# Patient Record
Sex: MALE | Race: WHITE | NOT HISPANIC OR LATINO | Employment: FULL TIME | URBAN - NONMETROPOLITAN AREA
[De-identification: names, ages, dates, MRNs, and addresses within clinical notes are randomized per-mention and may not be internally consistent; named-entity substitution may affect disease eponyms.]

---

## 2018-02-02 ENCOUNTER — TRANSCRIBE ORDERS (OUTPATIENT)
Dept: ADMINISTRATIVE | Facility: HOSPITAL | Age: 43
End: 2018-02-02

## 2018-02-02 DIAGNOSIS — N18.2 CHRONIC KIDNEY DISEASE, STAGE II (MILD): Primary | ICD-10-CM

## 2020-06-26 ENCOUNTER — TRANSCRIBE ORDERS (OUTPATIENT)
Dept: ADMINISTRATIVE | Facility: HOSPITAL | Age: 45
End: 2020-06-26

## 2020-06-26 DIAGNOSIS — R94.31 NONSPECIFIC ABNORMAL ELECTROCARDIOGRAM (ECG) (EKG): Primary | ICD-10-CM

## 2020-06-26 DIAGNOSIS — I10 ESSENTIAL HYPERTENSION, MALIGNANT: ICD-10-CM

## 2020-07-02 ENCOUNTER — HOSPITAL ENCOUNTER (OUTPATIENT)
Dept: CARDIOLOGY | Facility: HOSPITAL | Age: 45
Discharge: HOME | End: 2020-07-02
Admitting: INTERNAL MEDICINE

## 2020-07-02 DIAGNOSIS — I10 ESSENTIAL HYPERTENSION, MALIGNANT: ICD-10-CM

## 2020-07-02 DIAGNOSIS — R94.31 NONSPECIFIC ABNORMAL ELECTROCARDIOGRAM (ECG) (EKG): ICD-10-CM

## 2020-07-02 LAB
BH CV ECHO MEAS - % IVS THICK: 18.6 %
BH CV ECHO MEAS - % LVPW THICK: 35.3 %
BH CV ECHO MEAS - ACS: 2.9 CM
BH CV ECHO MEAS - AO ROOT AREA (BSA CORRECTED): 1.8
BH CV ECHO MEAS - AO ROOT AREA: 13.2 CM^2
BH CV ECHO MEAS - AO ROOT DIAM: 4.1 CM
BH CV ECHO MEAS - BSA(HAYCOCK): 2.4 M^2
BH CV ECHO MEAS - BSA: 2.3 M^2
BH CV ECHO MEAS - BZI_BMI: 31.7 KILOGRAMS/M^2
BH CV ECHO MEAS - BZI_METRIC_HEIGHT: 185.4 CM
BH CV ECHO MEAS - BZI_METRIC_WEIGHT: 108.9 KG
BH CV ECHO MEAS - EDV(CUBED): 194.1 ML
BH CV ECHO MEAS - EDV(TEICH): 165.9 ML
BH CV ECHO MEAS - EF(CUBED): 51.6 %
BH CV ECHO MEAS - EF(TEICH): 43 %
BH CV ECHO MEAS - ESV(CUBED): 93.9 ML
BH CV ECHO MEAS - ESV(TEICH): 94.6 ML
BH CV ECHO MEAS - FS: 21.5 %
BH CV ECHO MEAS - IVS/LVPW: 1.1
BH CV ECHO MEAS - IVSD: 1.7 CM
BH CV ECHO MEAS - IVSS: 2 CM
BH CV ECHO MEAS - LA DIMENSION: 5.2 CM
BH CV ECHO MEAS - LA/AO: 1.3
BH CV ECHO MEAS - LV MASS(C)D: 443.6 GRAMS
BH CV ECHO MEAS - LV MASS(C)DI: 190.8 GRAMS/M^2
BH CV ECHO MEAS - LV MASS(C)S: 450.8 GRAMS
BH CV ECHO MEAS - LV MASS(C)SI: 193.9 GRAMS/M^2
BH CV ECHO MEAS - LVIDD: 5.8 CM
BH CV ECHO MEAS - LVIDS: 4.5 CM
BH CV ECHO MEAS - LVOT AREA (M): 5.7 CM^2
BH CV ECHO MEAS - LVOT AREA: 5.7 CM^2
BH CV ECHO MEAS - LVOT DIAM: 2.7 CM
BH CV ECHO MEAS - LVPWD: 1.5 CM
BH CV ECHO MEAS - LVPWS: 2.1 CM
BH CV ECHO MEAS - MV A MAX VEL: 81 CM/SEC
BH CV ECHO MEAS - MV E MAX VEL: 74.6 CM/SEC
BH CV ECHO MEAS - MV E/A: 0.92
BH CV ECHO MEAS - PA ACC TIME: 0.09 SEC
BH CV ECHO MEAS - PA PR(ACCEL): 37.6 MMHG
BH CV ECHO MEAS - RVDD: 3.2 CM
BH CV ECHO MEAS - SI(CUBED): 43.1 ML/M^2
BH CV ECHO MEAS - SI(TEICH): 30.7 ML/M^2
BH CV ECHO MEAS - SV(CUBED): 100.2 ML
BH CV ECHO MEAS - SV(TEICH): 71.3 ML
MAXIMAL PREDICTED HEART RATE: 176 BPM
STRESS TARGET HR: 150 BPM

## 2020-07-02 PROCEDURE — 93306 TTE W/DOPPLER COMPLETE: CPT

## 2020-12-03 ENCOUNTER — TRANSCRIBE ORDERS (OUTPATIENT)
Dept: PHYSICAL THERAPY | Facility: CLINIC | Age: 45
End: 2020-12-03

## 2020-12-03 DIAGNOSIS — M77.8 LEFT ELBOW TENDONITIS: Primary | ICD-10-CM

## 2020-12-04 ENCOUNTER — TREATMENT (OUTPATIENT)
Dept: PHYSICAL THERAPY | Facility: CLINIC | Age: 45
End: 2020-12-04

## 2020-12-04 DIAGNOSIS — G56.22 CUBITAL TUNNEL SYNDROME ON LEFT: ICD-10-CM

## 2020-12-04 DIAGNOSIS — M25.522 LEFT ELBOW PAIN: Primary | ICD-10-CM

## 2020-12-04 PROCEDURE — 97162 PT EVAL MOD COMPLEX 30 MIN: CPT | Performed by: PHYSICAL THERAPIST

## 2020-12-04 PROCEDURE — 97110 THERAPEUTIC EXERCISES: CPT | Performed by: PHYSICAL THERAPIST

## 2020-12-04 PROCEDURE — 97035 APP MDLTY 1+ULTRASOUND EA 15: CPT | Performed by: PHYSICAL THERAPIST

## 2020-12-04 NOTE — PROGRESS NOTES
Physical Therapy Initial Evaluation and Plan of Care        Patient: Jose Juan Cabrera   : 1975  Diagnosis/ICD-10 Code:  Left lateral epicondylitis [M77.12]  Referring practitioner: CHARY Overton  Date of Initial Visit: 2020  Today's Date: 2020  Patient seen for 1 sessions    Visit Diagnoses:    ICD-10-CM ICD-9-CM   1. Left elbow pain  M25.522 719.42   2. Cubital tunnel syndrome on left  G56.22 354.2            Subjective Questionnaire:       Subjective Evaluation    History of Present Illness  Onset date: 2 weeks.  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  Around two weeks ago, the patient was changing an element of a compressor, and while using a long ratchet that was stuck, he struck his left medial elbow against the compressor.   Following the injury, he began to suffer from increased burning of the left  fourth and fifth finger that later resulted in numbness of theses fingers.  He notified his supervisor, and the patient was referred to [facility] the soonest available date. He was evaluated, and with continued pain a week later, the patient was ordered physical therapy for treatment of elbow pain.    Quality of life: good    Pain  Current pain ratin  At best pain ratin  At worst pain rating: 10  Location: olecranon canal  Quality: sharp  Exacerbated by: weight bearing.  Progression: no change    Hand dominance: right    Patient Goals  Patient goals for therapy: decreased pain, increased strength, independence with ADLs/IADLs, return to sport/leisure activities and return to work             Objective          Observations     Additional Elbow Observation Details  No bruising or edema noted to left arm    Palpation     Additional Palpation Details  Reports tenderness along the left medial epicondyle and along the cubital tunnel     Neurological Testing     Sensation     Elbow   Left Elbow   Intact: light  touch    Right Elbow   Intact: light touch    Comments   Left light touch: reports left ulna distribution numb in the mornings.     Active Range of Motion     Left Elbow   Flexion: WFL  Extension: WFL  Forearm supination: WFL  Forearm pronation: WFL    Right Elbow   Flexion: WFL  Extension: WFL  Forearm supination: WFL  Forearm pronation: WFL    Left Wrist   Wrist flexion: 64 degrees   Wrist extension: 68 degrees     Right Wrist   Wrist flexion: 70 degrees   Wrist extension: 75 degrees     Strength/Myotome Testing     Left Elbow   Flexion: 4  Extension: 4-  Forearm supination: 4  Forearm pronation: 4    Right Elbow   Flexion: 4+  Extension: 4+  Forearm supination: 4+  Forearm pronation: 4+    Left Wrist/Hand   Wrist extension: 4- (pain)  Wrist flexion: 4- (pain)  Radial deviation: 3+  Ulnar deviation: 4-     (2nd hand position)     Trial 1: 125 lbs    Trial 2: 122 lbs    Trial 3: 110 lbs    Average: 119 lbs    Right Wrist/Hand   Wrist extension: 4+  Wrist flexion: 4+  Radial deviation: 4+  Ulnar deviation: 4+     (2nd hand position)     Trial 1: 130 lbs    Trial 2: 135 lbs    Trial 3: 125 lbs    Average: 130 lbs    Tests     Left Elbow   Negative valgus stress at 0 degrees and varus stress at 0 degrees.           Assessment & Plan     Assessment  Impairments: abnormal muscle firing, abnormal or restricted ROM, activity intolerance, impaired physical strength, lacks appropriate home exercise program, pain with function, safety issue and weight-bearing intolerance  Assessment details: Pt is a 46 y/o male referred to therapy for rehab following a cubital tunnel injury.  Pt presents with decreased wrist ROM, tenderness along the left elbow medial epicondyle and cubital tunnel with inflammation of left ulna nerve.  Therapy will follow for restoration of rom, improved stability and decreased pain for safe return to occupational tasks.  Prognosis: good  Functional Limitations: carrying objects, lifting, pulling,  uncomfortable because of pain and unable to perform repetitive tasks  Goals  Plan Goals: STG 2 weeks    1 Pt will be instructed in a HEP.  2 Pt will report a 50% decrease in numbness from left ulna nerve distribution.  3 Pt will improved left wrist ROM to the same as right.    LTG 6 weeks    1 Pt will report pain no greater tan 2/10 with all occupational tasks.  2 Pt will be able to weight bear on left UE by demonstration of 10 wall push ups with no pain.  3 Pt will report no numbness in the left fourth and fifth digits.    Plan  Therapy options: will be seen for skilled physical therapy services  Planned modality interventions: TENS, thermotherapy (hydrocollator packs) and ultrasound  Planned therapy interventions: balance/weight-bearing training, body mechanics training, flexibility, functional ROM exercises, home exercise program, IADL retraining, joint mobilization, manual therapy, neuromuscular re-education, postural training, soft tissue mobilization, strengthening, stretching and therapeutic activities  Duration in visits: 12  Duration in weeks: 6  Treatment plan discussed with: patient  Plan details: Will follow for optimal gains    Moderate Evaluation  28022    Re-evaluation   03532      Therapeutic exercise  50514  Therapeutic activity    99591  Neuromuscular re-education   53122  Manual therapy   09427    Attended e-stim  65788  Unattended e-stim (Private)  49114  Moist heat/cryotherapy 79949   Ultrasound   50486            Visit Diagnoses:    ICD-10-CM ICD-9-CM   1. Left lateral epicondylitis  M77.12 726.32   2. Left elbow pain  M25.522 719.42       Timed:  Manual Therapy:         mins  03865;  Therapeutic Exercise:    10     mins  55172;     Neuromuscular Claudia:        mins  03031;    Therapeutic Activity:          mins  84932;     Gait Training:           mins  48523;     Ultrasound:     8     mins  28309;    Electrical Stimulation:         mins  33858 ( );    Untimed:  Electrical Stimulation:          mins  72482 ( );  Mechanical Traction:         mins  16926;     Timed Treatment:   18   mins   Total Treatment:     50   mins    PT SIGNATURE: Butch Paiz, PT   DATE TREATMENT INITIATED: 12/4/2020    Initial Certification  Certification Period: 3/4/2021  I certify that the therapy services are furnished while this patient is under my care.  The services outlined above are required by this patient, and will be reviewed every 90 days.     PHYSICIAN: Alexandra Manley, APRN      DATE:     Please sign and return via fax to [phone].. Thank you, [facility].

## 2020-12-11 ENCOUNTER — TREATMENT (OUTPATIENT)
Dept: PHYSICAL THERAPY | Facility: CLINIC | Age: 45
End: 2020-12-11

## 2020-12-11 DIAGNOSIS — G56.22 CUBITAL TUNNEL SYNDROME ON LEFT: ICD-10-CM

## 2020-12-11 DIAGNOSIS — M25.522 LEFT ELBOW PAIN: Primary | ICD-10-CM

## 2020-12-11 PROCEDURE — 97530 THERAPEUTIC ACTIVITIES: CPT | Performed by: PHYSICAL THERAPIST

## 2020-12-11 PROCEDURE — 97035 APP MDLTY 1+ULTRASOUND EA 15: CPT | Performed by: PHYSICAL THERAPIST

## 2020-12-11 PROCEDURE — 97110 THERAPEUTIC EXERCISES: CPT | Performed by: PHYSICAL THERAPIST

## 2020-12-11 NOTE — PROGRESS NOTES
Physical Therapy Daily Progress Note      Patient: Jose Juan Cabrera   : 1975  Referring practitioner: CHARY Overton  Date of Initial Visit: Type: THERAPY  Noted: 2020  Today's Date: 2020  Patient seen for 2 sessions           Subjective Evaluation    History of Present Illness    Subjective comment: Pt reports 6/10 left elbow pain prior to today's session. He states he continues to have numbness of the fourth and fifth fingers on the left hand.Pain  Current pain ratin           Objective   See Exercise, Manual, and Modality Logs for complete treatment.       Assessment & Plan     Assessment  Assessment details: Therapeutic exercises were performed to address left forearm and wrist strength, as well as muscle stretching. Therapeutic activities included dowel nayely twisting and radial/ulnar deviation with 2# weights to simulate wrist movements required for his job. Rest breaks were provided as needed secondary to muscle fatigue. Fasciculations were observed with eccentric control on weighted activities. The session concluded with therapeutic ultrasound to the left elbow musculature, with no skin irritation observed.    Plan  Plan details: Progress with strengthening and endurance activities for improved function.        Visit Diagnoses:    ICD-10-CM ICD-9-CM   1. Left elbow pain  M25.522 719.42   2. Cubital tunnel syndrome on left  G56.22 354.2       Progress per Plan of Care           Timed:  Manual Therapy:         mins  69074;  Therapeutic Exercise:    26     mins  52688;     Neuromuscular Claudia:        mins  58972;    Therapeutic Activity:    13      mins  63727;     Gait Training:           mins  62199;     Ultrasound:     8     mins  94484;    Electrical Stimulation:         mins  94251 ( );    Untimed:  Electrical Stimulation:         mins  59476 ( );  Mechanical Traction:         mins  56848;     Timed Treatment:   47   mins   Total Treatment:    47     mins Ashley Claudene Dalton, PT  Physical Therapist

## 2020-12-18 ENCOUNTER — TRANSCRIBE ORDERS (OUTPATIENT)
Dept: ADMINISTRATIVE | Facility: HOSPITAL | Age: 45
End: 2020-12-18

## 2020-12-18 ENCOUNTER — HOSPITAL ENCOUNTER (OUTPATIENT)
Dept: GENERAL RADIOLOGY | Facility: HOSPITAL | Age: 45
Discharge: HOME | End: 2020-12-18

## 2020-12-18 ENCOUNTER — TREATMENT (OUTPATIENT)
Dept: PHYSICAL THERAPY | Facility: CLINIC | Age: 45
End: 2020-12-18

## 2020-12-18 DIAGNOSIS — G56.22 CUBITAL TUNNEL SYNDROME ON LEFT: ICD-10-CM

## 2020-12-18 DIAGNOSIS — S53.115A CLOSED TRAUMATIC ANTERIOR DISLOCATION OF LEFT ELBOW JOINT, INITIAL ENCOUNTER: ICD-10-CM

## 2020-12-18 DIAGNOSIS — M25.522 LEFT ELBOW PAIN: Primary | ICD-10-CM

## 2020-12-18 DIAGNOSIS — S53.115A CLOSED TRAUMATIC ANTERIOR DISLOCATION OF LEFT ELBOW JOINT, INITIAL ENCOUNTER: Primary | ICD-10-CM

## 2020-12-18 PROCEDURE — 97035 APP MDLTY 1+ULTRASOUND EA 15: CPT | Performed by: PHYSICAL THERAPIST

## 2020-12-18 PROCEDURE — 73080 X-RAY EXAM OF ELBOW: CPT

## 2020-12-18 PROCEDURE — 73080 X-RAY EXAM OF ELBOW: CPT | Performed by: RADIOLOGY

## 2020-12-18 PROCEDURE — 97110 THERAPEUTIC EXERCISES: CPT | Performed by: PHYSICAL THERAPIST

## 2020-12-18 PROCEDURE — 97530 THERAPEUTIC ACTIVITIES: CPT | Performed by: PHYSICAL THERAPIST

## 2020-12-18 NOTE — PROGRESS NOTES
"   Physical Therapy Daily Progress Note      Patient: Jose Juan Cabrera   : 1975  Referring practitioner: CHARY Overton  Date of Initial Visit: Type: THERAPY  Noted: 2020  Today's Date: 2020  Patient seen for 3 sessions             Subjective Evaluation    History of Present Illness    Subjective comment: The patient reported numbness/tingling of the fourth and fifth fingers on the left hand prior to today's session. He stated he \"constantly has numbness\".Pain  Current pain ratin           Objective   See Exercise, Manual, and Modality Logs for complete treatment.       Assessment & Plan     Assessment  Assessment details: Patient was seen by CHARY Dutton prior to today's treatment session. She stated she is going to order and x-ray and MRI to further assess the patient's left elbow. Therapeutic activities were performed to improve the patient's ability to perform wrist deviation, as well as pronation and supination, required to perform job tasks. Nerve glides were also performed to address numbness and tingling into the ulnar distribution. No skin irritation was observed following therapeutic ultrasound to the left elbow.    Plan  Plan details: Progress per POC and CHARY Dutton recommendation based on imaging results.        Visit Diagnoses:    ICD-10-CM ICD-9-CM   1. Left elbow pain  M25.522 719.42   2. Cubital tunnel syndrome on left  G56.22 354.2       Progress per Plan of Care           Timed:  Manual Therapy:         mins  58963;  Therapeutic Exercise:    24     mins  69294;     Neuromuscular Claudia:        mins  30607;    Therapeutic Activity:     12     mins  69556;     Gait Training:           mins  71317;     Ultrasound:     8     mins  27270;    Electrical Stimulation:         mins  90325 ( );    Untimed:  Electrical Stimulation:         mins  52713 ( );  Mechanical Traction:         mins  95738;     Timed Treatment:   44   mins   Total " Treatment:     44   mins    Ashley Claudene Dalton, PT  Physical Therapist

## 2020-12-28 ENCOUNTER — TRANSCRIBE ORDERS (OUTPATIENT)
Dept: ADMINISTRATIVE | Facility: HOSPITAL | Age: 45
End: 2020-12-28

## 2020-12-28 DIAGNOSIS — M25.522 LEFT ELBOW PAIN: Primary | ICD-10-CM

## 2021-01-07 ENCOUNTER — TREATMENT (OUTPATIENT)
Dept: PHYSICAL THERAPY | Facility: CLINIC | Age: 46
End: 2021-01-07

## 2021-01-07 DIAGNOSIS — G56.22 CUBITAL TUNNEL SYNDROME ON LEFT: ICD-10-CM

## 2021-01-07 DIAGNOSIS — M25.522 LEFT ELBOW PAIN: Primary | ICD-10-CM

## 2021-01-07 PROCEDURE — 97110 THERAPEUTIC EXERCISES: CPT | Performed by: PHYSICAL THERAPIST

## 2021-01-07 PROCEDURE — 97035 APP MDLTY 1+ULTRASOUND EA 15: CPT | Performed by: PHYSICAL THERAPIST

## 2021-01-07 PROCEDURE — 97530 THERAPEUTIC ACTIVITIES: CPT | Performed by: PHYSICAL THERAPIST

## 2021-01-07 NOTE — PROGRESS NOTES
Patient: Jose Juan Cabrera   : 1975  Referring practitioner: CHARY Overton  Date of Initial Visit: Type: THERAPY  Noted: 2020  Today's Date: 2021  Patient seen for 4 sessions           Subjective Evaluation    History of Present Illness    Subjective comment: Pt reprots having continued increased pain at 7/10.  He reports he has been scheduled an MRI of the elbow.       Objective   See Exercise, Manual, and Modality Logs for complete treatment.       Assessment & Plan     Assessment  Assessment details: Tx today consisted of there ex for wrist and forearm stretching followed by stability and functional training and ended with US to elbow for decreased inflammation.  Pt responded well to tx with similar pain at /10 post tx.    Plan  Plan details: Therapy will follow progressing elbow stability and decreased pain for improved performance of work.        Visit Diagnoses:    ICD-10-CM ICD-9-CM   1. Left elbow pain  M25.522 719.42   2. Cubital tunnel syndrome on left  G56.22 354.2       Progress strengthening /stabilization /functional activity           Timed:  Manual Therapy:         mins  77422;  Therapeutic Exercise:    25     mins  85261;     Neuromuscular Claudia:        mins  60094;    Therapeutic Activity:     12     mins  64016;     Gait Training:           mins  64300;     Ultrasound:     8     mins  29159;    Electrical Stimulation:         mins  26926 ( );    Untimed:  Electrical Stimulation:         mins  49499 ( );  Mechanical Traction:         mins  79489;     Timed Treatment:   45   mins   Total Treatment:     45   mins  Butch Paiz PT  Physical Therapist

## 2021-01-11 ENCOUNTER — HOSPITAL ENCOUNTER (OUTPATIENT)
Dept: MRI IMAGING | Facility: HOSPITAL | Age: 46
Discharge: HOME | End: 2021-01-11
Admitting: NURSE PRACTITIONER

## 2021-01-11 DIAGNOSIS — M25.522 LEFT ELBOW PAIN: ICD-10-CM

## 2021-01-11 PROCEDURE — 73221 MRI JOINT UPR EXTREM W/O DYE: CPT

## 2021-01-11 PROCEDURE — 73221 MRI JOINT UPR EXTREM W/O DYE: CPT | Performed by: RADIOLOGY

## 2021-01-15 ENCOUNTER — TREATMENT (OUTPATIENT)
Dept: PHYSICAL THERAPY | Facility: CLINIC | Age: 46
End: 2021-01-15

## 2021-01-15 DIAGNOSIS — G56.22 CUBITAL TUNNEL SYNDROME ON LEFT: ICD-10-CM

## 2021-01-15 DIAGNOSIS — M25.522 LEFT ELBOW PAIN: Primary | ICD-10-CM

## 2021-01-15 PROCEDURE — 97110 THERAPEUTIC EXERCISES: CPT | Performed by: PHYSICAL THERAPIST

## 2021-01-15 PROCEDURE — 97530 THERAPEUTIC ACTIVITIES: CPT | Performed by: PHYSICAL THERAPIST

## 2021-01-15 PROCEDURE — 97035 APP MDLTY 1+ULTRASOUND EA 15: CPT | Performed by: PHYSICAL THERAPIST

## 2021-01-15 NOTE — PROGRESS NOTES
"Progress Note      Patient: Jose Juan Cabrera   : 1975  Diagnosis/ICD-10 Code:  Left elbow pain [M25.522]  Referring practitioner: CHARY Overton  Date of Initial Visit: Type: THERAPY  Noted: 2020  Today's Date: 1/15/2021  Patient seen for 5 sessions      Subjective:   Subjective Questionnaire: QuickDASH: 18.18% impaired  Clinical Progress: improved  Home Program Compliance: Yes    Subjective Evaluation    History of Present Illness    Subjective comment: Patient reports that his \"elbow is the same as it was the first day.\"  He notes that numbness is present only in the morning.  He notes that he is waiting to receive an appt with orthopedic MD, but will see referring MD on 2021 if he has not yet seen the orthopedic.Pain  Current pain ratin  At best pain ratin  At worst pain ratin         Objective          Active Range of Motion     Left Elbow   Flexion: WFL  Extension: WFL  Forearm supination: WFL  Forearm pronation: WFL    Right Elbow   Flexion: WFL  Extension: WFL  Forearm supination: WFL  Forearm pronation: WFL    Left Wrist   Wrist flexion: 64 degrees   Wrist extension: 70 degrees     Right Wrist   Wrist flexion: 70 degrees   Wrist extension: 75 degrees     Strength/Myotome Testing     Left Elbow   Flexion: 4+  Extension: 4+  Forearm supination: 4+  Forearm pronation: 4+    Right Elbow   Flexion: 4+  Extension: 4+  Forearm supination: 4+  Forearm pronation: 4+    Left Wrist/Hand   Wrist extension: 4 (pain)  Wrist flexion: 4 (pain)  Radial deviation: 4  Ulnar deviation: 4     (2nd hand position)    Trial 1: 140 lbs    Trial 2: 120 lbs    Trial 3: 130 lbs    Average: 130 lbs    Right Wrist/Hand   Wrist extension: 4+  Wrist flexion: 4+  Radial deviation: 4+  Ulnar deviation: 4+     (2nd hand position)     Trial 1: 150 lbs    Trial 2: 140 lbs    Trial 3: 135 lbs    Average: 141.67 lbs    Additional Strength Details  Pain noted with pronation/supination MMT    "   Assessment & Plan     Assessment  Impairments: abnormal muscle firing, abnormal or restricted ROM, activity intolerance, impaired physical strength, lacks appropriate home exercise program, pain with function, safety issue and weight-bearing intolerance  Assessment details: Patient has been attending therapy for rehabilitation following a cubital tunnel injury. Patient reports 7/10 pain at best and 8/10 pain at worst.  Patient has shown slight improvement in wrist extension ROM, but no measurable changes are noted in wrist flexion ROM.  Strength improvements are noted at left wrist and elbow during MMT.  Patient reports an 18.18% functional mobility impairment, based on his response to the QuickDash.  He will continue to be progressed per his tolerance and POC.   Prognosis: good  Functional Limitations: carrying objects, lifting, pulling, uncomfortable because of pain and unable to perform repetitive tasks  Goals  Plan Goals: STG 2 weeks  1 Pt will be instructed in a HEP.  Met  2 Pt will report a 50% decrease in numbness from left ulna nerve distribution.  Ongoing-0% improvement reported, per patient  3 Pt will improved left wrist ROM to the same as right.   Ongoing, progressing    LTG 6 weeks  1 Pt will report pain no greater than 2/10 with all occupational tasks.  Ongoing, progressing-up to 10/10 pain with work tasks  2 Pt will be able to weight bear on left UE by demonstration of 10 wall push ups with no pain.  Ongoing, progressing  3 Pt will report no numbness in the left fourth and fifth digits.  Ongoing-reports no change in numbness in the 4th and 5th digits    Plan  Therapy options: will be seen for skilled physical therapy services  Planned modality interventions: TENS, thermotherapy (hydrocollator packs) and ultrasound  Planned therapy interventions: balance/weight-bearing training, body mechanics training, flexibility, functional ROM exercises, home exercise program, IADL retraining, joint mobilization,  manual therapy, neuromuscular re-education, postural training, soft tissue mobilization, strengthening, stretching and therapeutic activities  Duration in visits: 8  Duration in weeks: 4  Treatment plan discussed with: patient  Plan details: Re-evaluation   13721      Therapeutic exercise  68465  Therapeutic activity    63322  Neuromuscular re-education   02829  Manual therapy   71481    Attended e-stim  37274  Unattended e-stim (Private)  68291  Moist heat/cryotherapy 83322   Ultrasound   79134            Visit Diagnoses:    ICD-10-CM ICD-9-CM   1. Left elbow pain  M25.522 719.42   2. Cubital tunnel syndrome on left  G56.22 354.2       Progress toward previous goals: Ongoing, progressing      Recommendations: Continue as planned  Timeframe: 1 month  Prognosis to achieve goals: good    PT Signature: Magi Matute, PT      Based upon review of the patient's progress and continued therapy plan, it is my medical opinion that Jose Juan Cabrera should continue physical therapy treatment at [facility]  [address]  [address]  [facility] [facility] [place] [address] [phone].    Signature: __________________________________  Alexandra Manley, CHARY    Timed:  Manual Therapy:         mins  99090;  Therapeutic Exercise:    25     mins  37958;     Neuromuscular Claudia:        mins  34010;    Therapeutic Activity:     11     mins  58608;     Gait Training:           mins  31514;     Ultrasound:     8     mins  23400;    Electrical Stimulation:         mins  87588 ( );    Untimed:  Electrical Stimulation:         mins  71696 ( );  Mechanical Traction:         mins  22666;     Timed Treatment:   44   mins   Total Treatment:     44   mins

## 2021-01-22 ENCOUNTER — TREATMENT (OUTPATIENT)
Dept: PHYSICAL THERAPY | Facility: CLINIC | Age: 46
End: 2021-01-22

## 2021-01-22 DIAGNOSIS — G56.22 CUBITAL TUNNEL SYNDROME ON LEFT: ICD-10-CM

## 2021-01-22 DIAGNOSIS — M25.522 LEFT ELBOW PAIN: Primary | ICD-10-CM

## 2021-01-22 PROCEDURE — 97035 APP MDLTY 1+ULTRASOUND EA 15: CPT | Performed by: PHYSICAL THERAPIST

## 2021-01-22 PROCEDURE — 97110 THERAPEUTIC EXERCISES: CPT | Performed by: PHYSICAL THERAPIST

## 2021-01-22 PROCEDURE — 97530 THERAPEUTIC ACTIVITIES: CPT | Performed by: PHYSICAL THERAPIST

## 2021-01-22 NOTE — PROGRESS NOTES
"   Physical Therapy Daily Progress Note      Patient: Jose Juan Cabrera   : 1975  Referring practitioner: CHARY Overton  Date of Initial Visit: Type: THERAPY  Noted: 2020  Today's Date: 2021  Patient seen for 6 sessions           Subjective Evaluation    History of Present Illness    Subjective comment: Pt reports 7/10 left elbow pain prior to today's session. He states the pain is \"frustrating\". The patient reports he \"had to use it a little more this week, spiking to 10/10\". He states he is scheduled to see an orthopedic on 2021.Pain  Current pain ratin           Objective   See Exercise, Manual, and Modality Logs for complete treatment.       Assessment & Plan     Assessment  Assessment details: Today's session included therapeutic exercises to improve muscle tightness and ROM of the left elbow and wrist. Therapeutic activities were performed to improve functional movements and activities required for him job. The patient reported weakness with wrist flexion and extension activities with a 2# weight. Therapeutic ultrasound was performed on the left wrist flexion, with no skin irritation observed. The patient reported no increase in pain following today's session.     Plan  Plan details: Continue to progress strengthening activities for improved strength.        Visit Diagnoses:    ICD-10-CM ICD-9-CM   1. Left elbow pain  M25.522 719.42   2. Cubital tunnel syndrome on left  G56.22 354.2       Progress per Plan of Care           Timed:  Manual Therapy:         mins  05625;  Therapeutic Exercise:    26     mins  72531;     Neuromuscular Claudia:        mins  78325;    Therapeutic Activity:    10      mins  26010;     Gait Training:           mins  64188;     Ultrasound:     8     mins  57503;    Electrical Stimulation:         mins  69094 ( );    Untimed:  Electrical Stimulation:         mins  49886 ( );  Mechanical Traction:         mins  41573;     Timed Treatment:   " 44   mins   Total Treatment:     44   mins    Ashley Claudene Dalton, PT  Physical Therapist

## 2021-01-29 ENCOUNTER — TREATMENT (OUTPATIENT)
Dept: PHYSICAL THERAPY | Facility: CLINIC | Age: 46
End: 2021-01-29

## 2021-01-29 DIAGNOSIS — M25.522 LEFT ELBOW PAIN: Primary | ICD-10-CM

## 2021-01-29 DIAGNOSIS — G56.22 CUBITAL TUNNEL SYNDROME ON LEFT: ICD-10-CM

## 2021-01-29 PROCEDURE — 97530 THERAPEUTIC ACTIVITIES: CPT | Performed by: PHYSICAL THERAPIST

## 2021-01-29 PROCEDURE — 97035 APP MDLTY 1+ULTRASOUND EA 15: CPT | Performed by: PHYSICAL THERAPIST

## 2021-01-29 PROCEDURE — 97110 THERAPEUTIC EXERCISES: CPT | Performed by: PHYSICAL THERAPIST

## 2021-01-29 NOTE — PROGRESS NOTES
Physical Therapy Daily Treatment Note      Patient: Jose Juan Cabrera   : 1975  Referring practitioner: CHARY Overton  Date of Initial Visit: Type: THERAPY  Noted: 2020  Today's Date: 2021  Patient seen for 7 sessions             Subjective Evaluation    History of Present Illness    Subjective comment: Pt reports 7/10 left elbow pain prior to today's session. He states he is scheduled to see an orthopedic on 21.Pain  Current pain ratin           Objective   See Exercise, Manual, and Modality Logs for complete treatment.       Assessment & Plan     Assessment  Assessment details: Therapeutic activities were performed during today's session to improve ability to perform functional tasks such as using a hammer and screwing in objects which would require pronation and supination. Therapeutic exercises were performed to address strength and ROM. Rest breaks were provided as needed and no increase in pain was reported with therapeutic exercises and activities. No skin irritation was observed following therapeutic ultrasound to the medial aspect of the left elbow at the conclusion of today's session.    Plan  Plan details: Progress as instructed by orthopedic.        Visit Diagnoses:    ICD-10-CM ICD-9-CM   1. Left elbow pain  M25.522 719.42   2. Cubital tunnel syndrome on left  G56.22 354.2       Progress per Plan of Care           Timed:  Manual Therapy:         mins  41681;  Therapeutic Exercise:    25     mins  83471;     Neuromuscular Claudia:        mins  18310;    Therapeutic Activity:     12     mins  33003;     Gait Training:           mins  24384;     Ultrasound:     8     mins  88596;    Electrical Stimulation:         mins  76029 ( );    Untimed:  Electrical Stimulation:         mins  82316 ( );  Mechanical Traction:         mins  30808;     Timed Treatment:   45   mins   Total Treatment:      45  mins    Ashley Claudene Dalton, PT  Physical  Therapist

## 2023-08-18 DIAGNOSIS — Z12.11 ENCOUNTER FOR SCREENING FOR MALIGNANT NEOPLASM OF COLON: Primary | ICD-10-CM

## 2023-08-18 RX ORDER — BISACODYL 5 MG/1
20 TABLET, DELAYED RELEASE ORAL ONCE
Qty: 4 TABLET | Refills: 0 | Status: SHIPPED | OUTPATIENT
Start: 2023-08-18 | End: 2023-08-18

## 2023-08-18 RX ORDER — POLYETHYLENE GLYCOL 3350 17 G/17G
510 POWDER, FOR SOLUTION ORAL ONCE
Qty: 510 G | Refills: 0 | Status: SHIPPED | OUTPATIENT
Start: 2023-08-18 | End: 2023-08-18

## 2023-10-12 ENCOUNTER — ANESTHESIA (OUTPATIENT)
Dept: PERIOP | Facility: HOSPITAL | Age: 48
End: 2023-10-12
Payer: MEDICAID

## 2023-10-12 ENCOUNTER — ANESTHESIA EVENT (OUTPATIENT)
Dept: PERIOP | Facility: HOSPITAL | Age: 48
End: 2023-10-12
Payer: MEDICAID

## 2023-10-12 ENCOUNTER — HOSPITAL ENCOUNTER (OUTPATIENT)
Facility: HOSPITAL | Age: 48
Setting detail: HOSPITAL OUTPATIENT SURGERY
Discharge: HOME | End: 2023-10-12
Attending: INTERNAL MEDICINE | Admitting: INTERNAL MEDICINE
Payer: MEDICAID

## 2023-10-12 VITALS
BODY MASS INDEX: 33.13 KG/M2 | WEIGHT: 250 LBS | SYSTOLIC BLOOD PRESSURE: 141 MMHG | HEIGHT: 73 IN | HEART RATE: 72 BPM | RESPIRATION RATE: 18 BRPM | TEMPERATURE: 97.1 F | OXYGEN SATURATION: 93 % | DIASTOLIC BLOOD PRESSURE: 103 MMHG

## 2023-10-12 DIAGNOSIS — Z12.11 ENCOUNTER FOR SCREENING FOR MALIGNANT NEOPLASM OF COLON: ICD-10-CM

## 2023-10-12 PROCEDURE — 45385 COLONOSCOPY W/LESION REMOVAL: CPT | Performed by: INTERNAL MEDICINE

## 2023-10-12 PROCEDURE — 25010000002 PROPOFOL 200 MG/20ML EMULSION: Performed by: NURSE ANESTHETIST, CERTIFIED REGISTERED

## 2023-10-12 PROCEDURE — 25810000003 LACTATED RINGERS PER 1000 ML: Performed by: ANESTHESIOLOGY

## 2023-10-12 RX ORDER — SODIUM CHLORIDE, SODIUM LACTATE, POTASSIUM CHLORIDE, CALCIUM CHLORIDE 600; 310; 30; 20 MG/100ML; MG/100ML; MG/100ML; MG/100ML
100 INJECTION, SOLUTION INTRAVENOUS ONCE AS NEEDED
Status: DISCONTINUED | OUTPATIENT
Start: 2023-10-12 | End: 2023-10-12 | Stop reason: HOSPADM

## 2023-10-12 RX ORDER — PROPOFOL 10 MG/ML
INJECTION, EMULSION INTRAVENOUS CONTINUOUS PRN
Status: DISCONTINUED | OUTPATIENT
Start: 2023-10-12 | End: 2023-10-12 | Stop reason: SURG

## 2023-10-12 RX ORDER — SODIUM CHLORIDE, SODIUM LACTATE, POTASSIUM CHLORIDE, CALCIUM CHLORIDE 600; 310; 30; 20 MG/100ML; MG/100ML; MG/100ML; MG/100ML
125 INJECTION, SOLUTION INTRAVENOUS ONCE
Status: COMPLETED | OUTPATIENT
Start: 2023-10-12 | End: 2023-10-12

## 2023-10-12 RX ORDER — IPRATROPIUM BROMIDE AND ALBUTEROL SULFATE 2.5; .5 MG/3ML; MG/3ML
3 SOLUTION RESPIRATORY (INHALATION) ONCE AS NEEDED
Status: DISCONTINUED | OUTPATIENT
Start: 2023-10-12 | End: 2023-10-12 | Stop reason: HOSPADM

## 2023-10-12 RX ORDER — LIDOCAINE HYDROCHLORIDE 20 MG/ML
INJECTION, SOLUTION EPIDURAL; INFILTRATION; INTRACAUDAL; PERINEURAL AS NEEDED
Status: DISCONTINUED | OUTPATIENT
Start: 2023-10-12 | End: 2023-10-12 | Stop reason: SURG

## 2023-10-12 RX ORDER — ONDANSETRON 2 MG/ML
4 INJECTION INTRAMUSCULAR; INTRAVENOUS AS NEEDED
Status: DISCONTINUED | OUTPATIENT
Start: 2023-10-12 | End: 2023-10-12 | Stop reason: HOSPADM

## 2023-10-12 RX ORDER — MIDAZOLAM HYDROCHLORIDE 1 MG/ML
1 INJECTION INTRAMUSCULAR; INTRAVENOUS
Status: DISCONTINUED | OUTPATIENT
Start: 2023-10-12 | End: 2023-10-12 | Stop reason: HOSPADM

## 2023-10-12 RX ORDER — OXYCODONE HYDROCHLORIDE AND ACETAMINOPHEN 5; 325 MG/1; MG/1
1 TABLET ORAL ONCE AS NEEDED
Status: DISCONTINUED | OUTPATIENT
Start: 2023-10-12 | End: 2023-10-12 | Stop reason: HOSPADM

## 2023-10-12 RX ORDER — SODIUM CHLORIDE 0.9 % (FLUSH) 0.9 %
10 SYRINGE (ML) INJECTION AS NEEDED
Status: DISCONTINUED | OUTPATIENT
Start: 2023-10-12 | End: 2023-10-12 | Stop reason: HOSPADM

## 2023-10-12 RX ORDER — FENTANYL CITRATE 50 UG/ML
50 INJECTION, SOLUTION INTRAMUSCULAR; INTRAVENOUS
Status: DISCONTINUED | OUTPATIENT
Start: 2023-10-12 | End: 2023-10-12 | Stop reason: HOSPADM

## 2023-10-12 RX ORDER — SODIUM CHLORIDE 9 MG/ML
40 INJECTION, SOLUTION INTRAVENOUS AS NEEDED
Status: DISCONTINUED | OUTPATIENT
Start: 2023-10-12 | End: 2023-10-12 | Stop reason: HOSPADM

## 2023-10-12 RX ORDER — SODIUM CHLORIDE 0.9 % (FLUSH) 0.9 %
10 SYRINGE (ML) INJECTION EVERY 12 HOURS SCHEDULED
Status: DISCONTINUED | OUTPATIENT
Start: 2023-10-12 | End: 2023-10-12 | Stop reason: HOSPADM

## 2023-10-12 RX ORDER — MEPERIDINE HYDROCHLORIDE 25 MG/ML
12.5 INJECTION INTRAMUSCULAR; INTRAVENOUS; SUBCUTANEOUS
Status: DISCONTINUED | OUTPATIENT
Start: 2023-10-12 | End: 2023-10-12 | Stop reason: HOSPADM

## 2023-10-12 RX ADMIN — LIDOCAINE HYDROCHLORIDE 60 MG: 20 INJECTION, SOLUTION EPIDURAL; INFILTRATION; INTRACAUDAL; PERINEURAL at 08:22

## 2023-10-12 RX ADMIN — PROPOFOL 200 MCG/KG/MIN: 10 INJECTION, EMULSION INTRAVENOUS at 08:22

## 2023-10-12 RX ADMIN — SODIUM CHLORIDE, POTASSIUM CHLORIDE, SODIUM LACTATE AND CALCIUM CHLORIDE: 600; 310; 30; 20 INJECTION, SOLUTION INTRAVENOUS at 08:22

## 2023-10-12 NOTE — ANESTHESIA POSTPROCEDURE EVALUATION
Patient: Jose Juan Cabrera    Procedure Summary       Date: 10/12/23 Room / Location: [facility] OR [address] [place] [address] COR OR    Anesthesia Start: 0821 Anesthesia Stop: 0844    Procedure: COLONOSCOPY FOR SCREENING Diagnosis:       Encounter for screening for malignant neoplasm of colon      (Encounter for screening for malignant neoplasm of colon [Z12.11])    Surgeons: Tanya Vicente MD Provider: Mehdi Saldana MD    Anesthesia Type: general ASA Status: 2            Anesthesia Type: general    Vitals  Vitals Value Taken Time   /103 10/12/23 0925   Temp 97.1 øF (36.2 øC) 10/12/23 0845   Pulse 62 10/12/23 0927   Resp 18 10/12/23 0915   SpO2 92 % 10/12/23 0927   Vitals shown include unfiled device data.        Post Anesthesia Care and Evaluation    Patient location during evaluation: bedside  Patient participation: complete - patient participated  Level of consciousness: awake and alert  Pain score: 1  Pain management: adequate    Airway patency: patent  Anesthetic complications: No anesthetic complications  PONV Status: none  Cardiovascular status: acceptable  Respiratory status: acceptable  Hydration status: acceptable

## 2023-10-12 NOTE — ANESTHESIA PREPROCEDURE EVALUATION
Anesthesia Evaluation     Patient summary reviewed and Nursing notes reviewed   no history of anesthetic complications:   NPO Solid Status: > 8 hours  NPO Liquid Status: > 8 hours           Airway   Mallampati: II  TM distance: >3 FB  Neck ROM: full  Dental - normal exam     Pulmonary - negative pulmonary ROS    breath sounds clear to auscultation  Cardiovascular   Exercise tolerance: good (4-7 METS)    Rhythm: regular  Rate: normal    (+) hypertension      Neuro/Psych- negative ROS  GI/Hepatic/Renal/Endo - negative ROS     Musculoskeletal (-) negative ROS    Abdominal   (+) obese    Abdomen: soft.   Substance History - negative use     OB/GYN          Other - negative ROS       ROS/Med Hx Other:   Interpretation Summary    ú Left ventricular wall thickness is consistent with mild-to-moderate concentric hypertrophy.  ú Left ventricular systolic function is normal.  ú Estimated EF appears to be in the range of 56 - 60%.  ú Left ventricular diastolic dysfunction (grade I) consistent with impaired relaxation.  ú Normal cardiac chambers dimensions.  ú Mild aortic valve regurgitation is present.  ú Mild mitral valve regurgitation is present  ú Thetricuspid and pulmonic valves appear normal in structureand showed no regurgitations.  ú There is no evidence of pericardial effusion.                     Anesthesia Plan    ASA 2     general with block     intravenous induction     Pre-procedure education provided  Use of blood products discussed with  Consented to blood products.    Plan discussed with CRNA.    CODE STATUS:

## 2023-10-12 NOTE — H&P
[facility]    Patient Identification:  Name:  Jose Juan Cabrera  Age:  48 y.o.  Sex:  male  :  1975  MRN:  [id number]   Visit Number:  [id number]  Primary Care Physician:  Татьяна Nixon APRN       Chief complaint: Screening colonoscopy    History of presenting illness:  48 y.o. male presents today for screening colonoscopy.  The patient denies family history of colon cancer.  This is his first colonoscopy.  He has not had any unusual weight loss.  He denies abdominal pain.  Patient states that he has not had bright red blood per rectum or melena.  He denies change in bowel pattern.  Overall, the patient feels well.    ---------------------------------------------------------------------------------------------------------------------   Review of Systems   Constitutional:  Negative for activity change, appetite change, chills, fatigue, fever and unexpected weight change.   HENT:  Negative for mouth sores and trouble swallowing.    Eyes:  Negative for photophobia, pain and redness.   Respiratory:  Negative for cough and choking.    Cardiovascular:  Negative for chest pain and leg swelling.   Gastrointestinal:  Negative for abdominal distention, abdominal pain, anal bleeding, constipation, diarrhea, nausea, rectal pain and vomiting.   Endocrine: Negative for cold intolerance, heat intolerance and polyphagia.   Genitourinary:  Negative for difficulty urinating and dysuria.   Musculoskeletal:  Negative for arthralgias, joint swelling and myalgias.   Skin:  Negative for rash and wound.   Allergic/Immunologic: Negative for environmental allergies and food allergies.   Neurological:  Negative for dizziness and light-headedness.   Hematological:  Negative for adenopathy. Does not bruise/bleed easily.   Psychiatric/Behavioral:  Negative for sleep disturbance. The patient is not nervous/anxious.        ---------------------------------------------------------------------------------------------------------------------   Past Medical History:   Diagnosis Date    Ankle fracture     Fracture of wrist     Hypertension     Leg fracture      Past Surgical History:   Procedure Laterality Date    MANDIBLE SURGERY       Family History   Problem Relation Age of Onset    Rheumatologic disease Father     Hypertension Father     Cancer Maternal Grandmother     Collagen disease Maternal Grandmother      Social History     Socioeconomic History    Marital status:    Tobacco Use    Smoking status: Never   Vaping Use    Vaping Use: Never used   Substance and Sexual Activity    Alcohol use: No    Drug use: No    Sexual activity: Defer     ---------------------------------------------------------------------------------------------------------------------   Allergies:  Patient has no known allergies.  ---------------------------------------------------------------------------------------------------------------------   Prior to Admission Medications       Prescriptions Last Dose Informant Patient Reported? Taking?    ibuprofen (ADVIL,MOTRIN) 800 MG tablet Past Month  Yes Yes    Take 1 tablet by mouth Every 6 (Six) Hours As Needed for Mild Pain.    lisinopril (PRINIVIL,ZESTRIL) 20 MG tablet 10/12/2023  Yes Yes    Take 25 mg by mouth daily.          [facility] Scheduled Meds:  lactated ringers, 125 mL/hr, Intravenous, Once  sodium chloride, 10 mL, Intravenous, Q12H         ---------------------------------------------------------------------------------------------------------------------   Vital Signs:  Temp:  [97.6 °F (36.4 °C)] 97.6 °F (36.4 °C)  Heart Rate:  [63] 63  Resp:  [20] 20  BP: (162)/(116) 162/116      10/12/23  0725   Weight: 113 kg (250 lb)     Body mass index is 32.98 kg/m².  ---------------------------------------------------------------------------------------------------------------------   Physical  "Exam:  Constitutional:  Well-developed and well-nourished.  No respiratory distress.  Obese     HENT:  Head: Normocephalic and atraumatic.  Mouth:  Moist mucous membranes.    Eyes:  Conjunctivae and EOM are normal.  Pupils are equal, round, and reactive to light.  No scleral icterus.  Neck:  Neck supple.  No JVD present.    Cardiovascular:  Normal rate, regular rhythm and normal heart sounds with no murmur.  Pulmonary/Chest:  No respiratory distress, no wheezes, no crackles, with normal breath sounds and good air movement.  Abdominal:  Soft.  Bowel sounds are normal.  No distension and no tenderness.   Musculoskeletal:  No edema, no tenderness, and no deformity.  No red or swollen joints anywhere.    Neurological:  Alert and oriented to person, place, and time.  No cranial nerve deficit.  No tongue deviation.  No facial droop.  No slurred speech.   Skin:  Skin is warm and dry.  No rash noted.  No pallor.   Psychiatric:  Normal mood and affect.  Behavior is normal.  Judgment and thought content normal.   Peripheral vascular:  No edema and strong pulses on all 4 extremities.  Genitourinary:  ---------------------------------------------------------------------------------------------------------------------              Invalid input(s): \"PROT\"CrCl cannot be calculated (No successful lab value found.).  No results found for: \"AMMONIA\"          No results found for: \"HGBA1C\"  No results found for: \"TSH\", \"FREET4\"  No results found for: \"PREGTESTUR\", \"PREGSERUM\", \"HCG\", \"HCGQUANT\"  Pain Management Panel           No data to display              No results found for: \"BLOODCX\"  No results found for: \"URINECX\"  No results found for: \"WOUNDCX\"  No results found for: \"STOOLCX\"      ---------------------------------------------------------------------------------------------------------------------  Imaging Results (Last 7 Days)       ** No results found for the last 168 hours. **            I have personally reviewed the " radiology images and read the final radiology report.  ---------------------------------------------------------------------------------------------------------------------  Assessment and Plan:  Proceed with screening colonoscopy    Tanya Vicente MD  10/12/23  07:40 EDT

## 2023-10-13 LAB — REF LAB TEST METHOD: NORMAL

## 2023-10-17 NOTE — PROGRESS NOTES
At the time of your recent colonoscopy, 3 polyps were removed from the colon.  The polyps were tubular adenomas.  Tubular adenomas are precancerous.  Because of this you will need repeat colonoscopy in 5 years.

## 2025-02-24 ENCOUNTER — TRANSCRIBE ORDERS (OUTPATIENT)
Dept: ADMINISTRATIVE | Facility: HOSPITAL | Age: 50
End: 2025-02-24
Payer: COMMERCIAL

## 2025-02-24 ENCOUNTER — LAB (OUTPATIENT)
Dept: LAB | Facility: HOSPITAL | Age: 50
End: 2025-02-24
Payer: COMMERCIAL

## 2025-02-24 DIAGNOSIS — I10 ESSENTIAL HYPERTENSION, MALIGNANT: Primary | ICD-10-CM

## 2025-02-24 LAB
ALBUMIN SERPL-MCNC: 4.5 G/DL (ref 3.5–5.2)
ALBUMIN/GLOB SERPL: 1.6 G/DL
ALP SERPL-CCNC: 56 U/L (ref 39–117)
ALT SERPL W P-5'-P-CCNC: 78 U/L (ref 1–41)
ANION GAP SERPL CALCULATED.3IONS-SCNC: 11.9 MMOL/L (ref 5–15)
AST SERPL-CCNC: 51 U/L (ref 1–40)
BILIRUB SERPL-MCNC: 1.1 MG/DL (ref 0–1.2)
BUN SERPL-MCNC: 18 MG/DL (ref 6–20)
BUN/CREAT SERPL: 10.5 (ref 7–25)
CALCIUM SPEC-SCNC: 9.2 MG/DL (ref 8.6–10.5)
CHLORIDE SERPL-SCNC: 93 MMOL/L (ref 98–107)
CO2 SERPL-SCNC: 30.1 MMOL/L (ref 22–29)
CREAT SERPL-MCNC: 1.72 MG/DL (ref 0.76–1.27)
EGFRCR SERPLBLD CKD-EPI 2021: 48.1 ML/MIN/1.73
GLOBULIN UR ELPH-MCNC: 2.9 GM/DL
GLUCOSE SERPL-MCNC: 121 MG/DL (ref 65–99)
POTASSIUM SERPL-SCNC: 3.6 MMOL/L (ref 3.5–5.2)
PROT SERPL-MCNC: 7.4 G/DL (ref 6–8.5)
SODIUM SERPL-SCNC: 135 MMOL/L (ref 136–145)

## 2025-02-24 PROCEDURE — 36415 COLL VENOUS BLD VENIPUNCTURE: CPT

## 2025-02-24 PROCEDURE — 80053 COMPREHEN METABOLIC PANEL: CPT

## 2025-02-25 ENCOUNTER — INFUSION (OUTPATIENT)
Dept: ONCOLOGY | Facility: HOSPITAL | Age: 50
End: 2025-02-25
Payer: COMMERCIAL

## 2025-02-25 ENCOUNTER — HOSPITAL ENCOUNTER (OUTPATIENT)
Facility: HOSPITAL | Age: 50
Setting detail: OBSERVATION
Discharge: HOME | End: 2025-02-26
Attending: STUDENT IN AN ORGANIZED HEALTH CARE EDUCATION/TRAINING PROGRAM | Admitting: FAMILY MEDICINE
Payer: COMMERCIAL

## 2025-02-25 VITALS
TEMPERATURE: 99.7 F | HEART RATE: 45 BPM | OXYGEN SATURATION: 95 % | DIASTOLIC BLOOD PRESSURE: 46 MMHG | RESPIRATION RATE: 14 BRPM | SYSTOLIC BLOOD PRESSURE: 66 MMHG

## 2025-02-25 DIAGNOSIS — E86.0 DEHYDRATION: Primary | ICD-10-CM

## 2025-02-25 DIAGNOSIS — R79.89 ELEVATED TROPONIN: ICD-10-CM

## 2025-02-25 DIAGNOSIS — M62.82 NON-TRAUMATIC RHABDOMYOLYSIS: Primary | ICD-10-CM

## 2025-02-25 PROBLEM — N17.9 ACUTE KIDNEY INJURY: Status: ACTIVE | Noted: 2025-02-25

## 2025-02-25 LAB
ALBUMIN SERPL-MCNC: 3.6 G/DL (ref 3.5–5.2)
ALBUMIN/GLOB SERPL: 1.4 G/DL
ALP SERPL-CCNC: 44 U/L (ref 39–117)
ALT SERPL W P-5'-P-CCNC: 70 U/L (ref 1–41)
ANION GAP SERPL CALCULATED.3IONS-SCNC: 10.1 MMOL/L (ref 5–15)
AST SERPL-CCNC: 56 U/L (ref 1–40)
BASOPHILS # BLD AUTO: 0.02 10*3/MM3 (ref 0–0.2)
BASOPHILS NFR BLD AUTO: 0.3 % (ref 0–1.5)
BILIRUB SERPL-MCNC: 0.7 MG/DL (ref 0–1.2)
BUN SERPL-MCNC: 29 MG/DL (ref 6–20)
BUN/CREAT SERPL: 16.8 (ref 7–25)
CALCIUM SPEC-SCNC: 8.3 MG/DL (ref 8.6–10.5)
CHLORIDE SERPL-SCNC: 99 MMOL/L (ref 98–107)
CK SERPL-CCNC: 446 U/L (ref 20–200)
CO2 SERPL-SCNC: 28.9 MMOL/L (ref 22–29)
CREAT SERPL-MCNC: 1.73 MG/DL (ref 0.76–1.27)
D DIMER PPP FEU-MCNC: 0.27 MCGFEU/ML (ref 0–0.5)
DEPRECATED RDW RBC AUTO: 40.8 FL (ref 37–54)
EGFRCR SERPLBLD CKD-EPI 2021: 47.8 ML/MIN/1.73
EOSINOPHIL # BLD AUTO: 0.02 10*3/MM3 (ref 0–0.4)
EOSINOPHIL NFR BLD AUTO: 0.3 % (ref 0.3–6.2)
ERYTHROCYTE [DISTWIDTH] IN BLOOD BY AUTOMATED COUNT: 12.3 % (ref 12.3–15.4)
GEN 5 1HR TROPONIN T REFLEX: 22 NG/L
GLOBULIN UR ELPH-MCNC: 2.6 GM/DL
GLUCOSE BLDC GLUCOMTR-MCNC: 113 MG/DL (ref 70–130)
GLUCOSE SERPL-MCNC: 96 MG/DL (ref 65–99)
HCT VFR BLD AUTO: 48.3 % (ref 37.5–51)
HGB BLD-MCNC: 16.9 G/DL (ref 13–17.7)
IMM GRANULOCYTES # BLD AUTO: 0.01 10*3/MM3 (ref 0–0.05)
IMM GRANULOCYTES NFR BLD AUTO: 0.2 % (ref 0–0.5)
LYMPHOCYTES # BLD AUTO: 2.05 10*3/MM3 (ref 0.7–3.1)
LYMPHOCYTES NFR BLD AUTO: 33.4 % (ref 19.6–45.3)
MAGNESIUM SERPL-MCNC: 1.8 MG/DL (ref 1.6–2.6)
MCH RBC QN AUTO: 31.9 PG (ref 26.6–33)
MCHC RBC AUTO-ENTMCNC: 35 G/DL (ref 31.5–35.7)
MCV RBC AUTO: 91.1 FL (ref 79–97)
MONOCYTES # BLD AUTO: 0.99 10*3/MM3 (ref 0.1–0.9)
MONOCYTES NFR BLD AUTO: 16.2 % (ref 5–12)
NEUTROPHILS NFR BLD AUTO: 3.04 10*3/MM3 (ref 1.7–7)
NEUTROPHILS NFR BLD AUTO: 49.6 % (ref 42.7–76)
NRBC BLD AUTO-RTO: 0 /100 WBC (ref 0–0.2)
PLATELET # BLD AUTO: 125 10*3/MM3 (ref 140–450)
PMV BLD AUTO: 10.3 FL (ref 6–12)
POTASSIUM SERPL-SCNC: 3.1 MMOL/L (ref 3.5–5.2)
PROT SERPL-MCNC: 6.2 G/DL (ref 6–8.5)
RBC # BLD AUTO: 5.3 10*6/MM3 (ref 4.14–5.8)
SODIUM SERPL-SCNC: 138 MMOL/L (ref 136–145)
TROPONIN T % DELTA: -19
TROPONIN T NUMERIC DELTA: -5 NG/L
TROPONIN T SERPL HS-MCNC: 27 NG/L
WBC NRBC COR # BLD AUTO: 6.13 10*3/MM3 (ref 3.4–10.8)

## 2025-02-25 PROCEDURE — 96372 THER/PROPH/DIAG INJ SC/IM: CPT

## 2025-02-25 PROCEDURE — 96361 HYDRATE IV INFUSION ADD-ON: CPT

## 2025-02-25 PROCEDURE — 87636 SARSCOV2 & INF A&B AMP PRB: CPT

## 2025-02-25 PROCEDURE — 99223 1ST HOSP IP/OBS HIGH 75: CPT

## 2025-02-25 PROCEDURE — 82550 ASSAY OF CK (CPK): CPT | Performed by: STUDENT IN AN ORGANIZED HEALTH CARE EDUCATION/TRAINING PROGRAM

## 2025-02-25 PROCEDURE — 25010000002 HEPARIN (PORCINE) PER 1000 UNITS

## 2025-02-25 PROCEDURE — G0378 HOSPITAL OBSERVATION PER HR: HCPCS

## 2025-02-25 PROCEDURE — 25010000002 POTASSIUM CHLORIDE 10 MEQ/100ML SOLUTION: Performed by: STUDENT IN AN ORGANIZED HEALTH CARE EDUCATION/TRAINING PROGRAM

## 2025-02-25 PROCEDURE — 25010000002 MAGNESIUM SULFATE 4 GM/100ML SOLUTION: Performed by: HOSPITALIST

## 2025-02-25 PROCEDURE — 85379 FIBRIN DEGRADATION QUANT: CPT | Performed by: STUDENT IN AN ORGANIZED HEALTH CARE EDUCATION/TRAINING PROGRAM

## 2025-02-25 PROCEDURE — 96360 HYDRATION IV INFUSION INIT: CPT

## 2025-02-25 PROCEDURE — 83735 ASSAY OF MAGNESIUM: CPT

## 2025-02-25 PROCEDURE — 85025 COMPLETE CBC W/AUTO DIFF WBC: CPT | Performed by: STUDENT IN AN ORGANIZED HEALTH CARE EDUCATION/TRAINING PROGRAM

## 2025-02-25 PROCEDURE — 82948 REAGENT STRIP/BLOOD GLUCOSE: CPT

## 2025-02-25 PROCEDURE — 25810000003 SODIUM CHLORIDE 0.9 % SOLUTION

## 2025-02-25 PROCEDURE — 96365 THER/PROPH/DIAG IV INF INIT: CPT

## 2025-02-25 PROCEDURE — 80053 COMPREHEN METABOLIC PANEL: CPT | Performed by: STUDENT IN AN ORGANIZED HEALTH CARE EDUCATION/TRAINING PROGRAM

## 2025-02-25 PROCEDURE — 36415 COLL VENOUS BLD VENIPUNCTURE: CPT

## 2025-02-25 PROCEDURE — 99285 EMERGENCY DEPT VISIT HI MDM: CPT

## 2025-02-25 PROCEDURE — 84484 ASSAY OF TROPONIN QUANT: CPT | Performed by: STUDENT IN AN ORGANIZED HEALTH CARE EDUCATION/TRAINING PROGRAM

## 2025-02-25 PROCEDURE — 93005 ELECTROCARDIOGRAM TRACING: CPT | Performed by: STUDENT IN AN ORGANIZED HEALTH CARE EDUCATION/TRAINING PROGRAM

## 2025-02-25 RX ORDER — SODIUM CHLORIDE 0.9 % (FLUSH) 0.9 %
10 SYRINGE (ML) INJECTION AS NEEDED
Status: DISCONTINUED | OUTPATIENT
Start: 2025-02-25 | End: 2025-02-26

## 2025-02-25 RX ORDER — HEPARIN SODIUM 5000 [USP'U]/ML
5000 INJECTION, SOLUTION INTRAVENOUS; SUBCUTANEOUS EVERY 12 HOURS SCHEDULED
Status: DISCONTINUED | OUTPATIENT
Start: 2025-02-25 | End: 2025-02-26 | Stop reason: HOSPADM

## 2025-02-25 RX ORDER — POTASSIUM CHLORIDE 7.45 MG/ML
10 INJECTION INTRAVENOUS ONCE
Status: COMPLETED | OUTPATIENT
Start: 2025-02-25 | End: 2025-02-25

## 2025-02-25 RX ORDER — SODIUM CHLORIDE 9 MG/ML
100 INJECTION, SOLUTION INTRAVENOUS CONTINUOUS
Status: DISCONTINUED | OUTPATIENT
Start: 2025-02-25 | End: 2025-02-26

## 2025-02-25 RX ORDER — LISINOPRIL 10 MG/1
20 TABLET ORAL DAILY
Status: CANCELLED | OUTPATIENT
Start: 2025-02-26

## 2025-02-25 RX ORDER — SODIUM CHLORIDE 0.9 % (FLUSH) 0.9 %
10 SYRINGE (ML) INJECTION EVERY 12 HOURS SCHEDULED
Status: DISCONTINUED | OUTPATIENT
Start: 2025-02-25 | End: 2025-02-26

## 2025-02-25 RX ORDER — CHLORTHALIDONE 25 MG/1
12.5 TABLET ORAL DAILY
COMMUNITY
End: 2025-02-26 | Stop reason: HOSPADM

## 2025-02-25 RX ORDER — CHLORTHALIDONE 50 MG/1
12.5 TABLET ORAL DAILY
Status: CANCELLED | OUTPATIENT
Start: 2025-02-26

## 2025-02-25 RX ORDER — MAGNESIUM SULFATE HEPTAHYDRATE 40 MG/ML
4 INJECTION, SOLUTION INTRAVENOUS ONCE
Status: COMPLETED | OUTPATIENT
Start: 2025-02-25 | End: 2025-02-26

## 2025-02-25 RX ORDER — SODIUM CHLORIDE 9 MG/ML
40 INJECTION, SOLUTION INTRAVENOUS AS NEEDED
Status: DISCONTINUED | OUTPATIENT
Start: 2025-02-25 | End: 2025-02-26

## 2025-02-25 RX ADMIN — SODIUM CHLORIDE 100 ML/HR: 9 INJECTION, SOLUTION INTRAVENOUS at 18:54

## 2025-02-25 RX ADMIN — Medication 10 ML: at 21:42

## 2025-02-25 RX ADMIN — SODIUM CHLORIDE 100 ML/HR: 9 INJECTION, SOLUTION INTRAVENOUS at 21:49

## 2025-02-25 RX ADMIN — SODIUM CHLORIDE 1000 ML: 9 INJECTION, SOLUTION INTRAVENOUS at 14:55

## 2025-02-25 RX ADMIN — MAGNESIUM SULFATE HEPTAHYDRATE 4 G: 40 INJECTION, SOLUTION INTRAVENOUS at 23:46

## 2025-02-25 RX ADMIN — POTASSIUM CHLORIDE 10 MEQ: 7.46 INJECTION, SOLUTION INTRAVENOUS at 18:53

## 2025-02-25 RX ADMIN — HEPARIN SODIUM 5000 UNITS: 5000 INJECTION INTRAVENOUS; SUBCUTANEOUS at 21:42

## 2025-02-25 NOTE — PROGRESS NOTES
"14:37- Patient arrived to infusion clinic for 1L IVF bolus, ordered by patient's PCP. /75 HR 90 bpm.  14:55- Infusion RN obtained a 22 gauge IV in the patient's left AC and started NS IVF bolus. At that time, the patient requested to have his blood pressure checked and stated \"I don't feel right\".   14:58- BP 71/42 HR 52 bpm. Patient noted to be pale and diaphoretic. Patient stated \"I feel like I am going to pass out\". Patient reported having syncope episodes in the past, but did not specify when the last syncope episode occurred.   15:00- Fingerstick blood glucose obtained and resulted at 113. Patient's HR noted to be 45 bpm. 20 gauge PIV obtained in patient's right AC.   15:02- BP 66/46 HR 51 bpm.   15:03- Patient transferred to emergency department via stretcher for further evaluation. Bedside handoff report given to ED RN. Patient requesting that infusion RN call Deniz Pradhan or Mulu Prado and notify them of patient being transferred to emergency department.     "

## 2025-02-25 NOTE — ED PROVIDER NOTES
Subjective   History of Present Illness  The patient presents with symptoms of dehydration and influenza. He was diagnosed with influenza on Monday, with symptoms beginning on Sunday. The patient reports generalized body aches and neck pain. Despite attempting to maintain fluid intake, he experienced a near-syncopal episode with documented low blood pressure and bradycardia (HR in the 40s). The patient denies diarrhea, nausea, vomiting, headaches, or shortness of breath. Past medical history is significant for hypertension, managed with metoprolol. They deny any history of diabetes, cardiac conditions, or prior blood clots.        Review of Systems   All other systems reviewed and are negative.      Past Medical History:   Diagnosis Date    Ankle fracture     Fracture of wrist     Hypertension     Leg fracture        No Known Allergies    Past Surgical History:   Procedure Laterality Date    COLONOSCOPY N/A 10/12/2023    Procedure: COLONOSCOPY FOR SCREENING;  Surgeon: Tanya Vicente MD;  Location: [facility];  Service: Gastroenterology;  Laterality: N/A;    MANDIBLE SURGERY         Family History   Problem Relation Age of Onset    Rheumatologic disease Father     Hypertension Father     Cancer Maternal Grandmother     Collagen disease Maternal Grandmother        Social History     Socioeconomic History    Marital status:    Tobacco Use    Smoking status: Never   Vaping Use    Vaping status: Never Used   Substance and Sexual Activity    Alcohol use: No    Drug use: No    Sexual activity: Defer           Objective   Physical Exam    Constitutional:  General: Patient is not in acute distress.  Appearance: Patient is not diaphoretic.    HENT:  Head: Normocephalic and atraumatic.  Right Ear: External ear normal.  Left Ear: External ear normal.  Nose: Nose normal.  **Tongue: Examined, appears normal, not dry, no concerns for dry oral mucosa.     Eyes:  General: No scleral icterus.  Conjunctiva/sclera:  Conjunctivae normal.    Cardiovascular:  Rate and Rhythm: Normal rate and regular rhythm.  Heart sounds: No friction rub.    Pulmonary:  Effort: Pulmonary effort is normal. No respiratory distress.  Breath sounds: No stridor. No wheezing.    Abdominal:  **Palpations: Abdomen is soft, negative for tenderness.     Musculoskeletal:  General: No deformity.    Skin:  General: Skin is warm and dry. No rashes present. Normal color. Normal turgor.    Neurological:  General: No focal deficit present.  **Neck: Soft, not tender**    Procedures           ED Course  ED Course as of 02/26/25 0126   Tue Feb 25, 2025   1529 Positive for LBBB, does not meet sgarbossa criteria. Have not seen previous EKG, as there is no previousEKG data    Electronically signed by Amado Felix MD, 02/25/25, 3:30 PM EST.  [SG]   1635 Negative ddimer [SG]   1655 Positive troponin, remains negative for chest pain. Will contact cardiology. Positive for DOREEN, as well as elevated LFT. No abd pain, no jaundice, or RUQ tenderness. Will add CPK [SG]   1742 Ddimer negative, no concerns for PE. Case discussed with Dr. Valderrama who recommended admit for concerns of elevated troponin in the setting of Flu A, dizziness and hypotension. No active clinical concerns for cardiac tamponade, no distal heart sounds.  Will replete potassium, IV fluids were given.  [SG]   1751 Spoke to Admitting doctor, who accepted stable admit [SG]      ED Course User Index  [SG] Amado Felix MD                                                       Medical Decision Making  49 y.o M stable, hx of HTN, positive for near syncope. Had been sick with Flu A. PT was at infusion IV fluid clinic, positive for concerns of feeling lightheaded. Pending Ddimer, troponin, cbc, chemistry, he is getting iV fluids. Vital signs are stable. Positive for generalized body aches.     Patient has a left bundle branch block on the EKG, I reviewed previous EKG this is the only EKG on file.  Patient does not  have any chest pain, shortness of breath or palpitations.  No diaphoresis, nausea, vomiting.  Pending troponin as well as D-dimer.  Patient does not meet Sgarbossa criteria    Problems Addressed:  Elevated troponin: complicated acute illness or injury  Non-traumatic rhabdomyolysis: complicated acute illness or injury    Amount and/or Complexity of Data Reviewed  Labs: ordered.  ECG/medicine tests: ordered.    Risk  Prescription drug management.  Decision regarding hospitalization.        Final diagnoses:   Non-traumatic rhabdomyolysis   Elevated troponin       ED Disposition  ED Disposition       ED Disposition   Decision to Admit    Condition   --    Comment   Level of Care: Telemetry [5]   Diagnosis: Acute kidney injury [027000]                 No follow-up provider specified.       Medication List        ASK your doctor about these medications      chlorthalidone 25 MG tablet  Commonly known as: HYGROTON  Ask about: Which instructions should I use?     lisinopril 20 MG tablet  Commonly known as: PRINIVIL,ZESTRIL  Take 1 tablet by mouth Daily.  Ask about: Which instructions should I use?                 Amado Felix MD  02/26/25 0126

## 2025-02-25 NOTE — H&P
[facility] Medicine Services  HISTORY & PHYSICAL    Patient Identification:  Name:  Jose Juan Cabrera  Age:  49 y.o.  Sex:  male  :  1975  MRN:  [id number]   Visit Number:  [id number]  Admit Date: 2025   Primary Care Physician:  Fortunato Millan PA     Subjective     Chief complaint:   Chief Complaint   Patient presents with    Syncope    Hypotension     History of presenting illness:   Patient is a 49 y.o. male with past medical history significant for hypertension that presented to the [facility] emergency department for evaluation of hypotension and presyncope.    Patient examined at bedside in ED.  Patient reports that he saw his PCP for labs yesterday and was told his creatinine was high and they set him up to come get and infusion of IV fluids outpatient today.  Denies history of chronic kidney disease.  He states he had 1 episode of vomiting over the weekend and then after that began to have body aches and general malaise.  He states he tested positive for the flu yesterday.  He does state that he had been taking his lisinopril and chlorthalidone recently.  He did hold his chlorthalidone this morning after he learned he was dehydrated, but he did take his dose of lisinopril.  He was seen in the outpatient infusion clinic today for IV fluids and while waiting for the infusion he had a presyncopal episode and was reportedly hypotensive in the infusion clinic.  He was given a fluid bolus and transferred up to the emergency department.  Patient does report he has been told he had a left bundle branch block in the past.  Denies any chest pain or palpitations.    Upon arrival to the ED, vitals were temperature 98.2, HR 77, RR 18, /81, SpO2 90% on room air.  Labs significant for BUN 29, creatinine 1.73, GFR 47.8.  AST 56, ALT 78.  CK4 46, high-sensitivity troponin 27, repeat pending.     Patient has been admitted to the telemetry unit.    ---------------------------------------------------------------------------------------------------------------------   Review of Systems   Constitutional:  Positive for chills and fatigue. Negative for diaphoresis and fever.   Respiratory:  Negative for cough and shortness of breath.    Cardiovascular:  Negative for chest pain, palpitations and leg swelling.   Gastrointestinal:  Positive for vomiting. Negative for abdominal pain, constipation, diarrhea and nausea.   Genitourinary:  Negative for difficulty urinating and dysuria.   Musculoskeletal:  Positive for myalgias. Negative for arthralgias and neck stiffness.   Skin:  Negative for rash and wound.   Neurological:  Positive for dizziness, weakness and light-headedness.   Psychiatric/Behavioral:  Negative for agitation and confusion.       ---------------------------------------------------------------------------------------------------------------------   Past Medical History:   Diagnosis Date    Ankle fracture     Fracture of wrist     Hypertension     Leg fracture      Past Surgical History:   Procedure Laterality Date    COLONOSCOPY N/A 10/12/2023    Procedure: COLONOSCOPY FOR SCREENING;  Surgeon: Tanya Vicente MD;  Location: [facility] OR;  Service: Gastroenterology;  Laterality: N/A;    MANDIBLE SURGERY       Family History   Problem Relation Age of Onset    Rheumatologic disease Father     Hypertension Father     Cancer Maternal Grandmother     Collagen disease Maternal Grandmother      Social History     Socioeconomic History    Marital status:    Tobacco Use    Smoking status: Never   Vaping Use    Vaping status: Never Used   Substance and Sexual Activity    Alcohol use: No    Drug use: No    Sexual activity: Defer     ---------------------------------------------------------------------------------------------------------------------   Allergies:  Patient has no known  allergies.  ---------------------------------------------------------------------------------------------------------------------   Medications below are reported home medications pulling from within the system; at this time, these medications have not been reconciled unless otherwise specified and are in the verification process for further verifcation as current home medications.    Prior to Admission Medications       Prescriptions Last Dose Informant Patient Reported? Taking?    carvedilol (COREG) 6.25 MG tablet   No No    Take 1 tablet by mouth 2 (Two) Times a Day With Meals.    chlorthalidone (HYGROTON) 25 MG tablet   No No    Take 1/2 tablet by mouth Daily.    ibuprofen (ADVIL,MOTRIN) 800 MG tablet   Yes No    Take 1 tablet by mouth Every 6 (Six) Hours As Needed for Mild Pain.    lisinopril (PRINIVIL,ZESTRIL) 20 MG tablet   Yes No    Take 25 mg by mouth daily.    lisinopril (PRINIVIL,ZESTRIL) 20 MG tablet   No No    Take 1 tablet by mouth Daily.          ---------------------------------------------------------------------------------------------------------------------    Objective     Hospital Scheduled Meds:  heparin (porcine), 5,000 Units, Subcutaneous, Q12H  potassium chloride, 10 mEq, Intravenous, Once  sodium chloride, 10 mL, Intravenous, Q12H      sodium chloride, 100 mL/hr        Current listed hospital scheduled medications may not yet reflect those currently placed in orders that are signed and held, awaiting patient's arrival to floor/unit.    ---------------------------------------------------------------------------------------------------------------------   Vital Signs:  Temp:  [98.2 °F (36.8 °C)-99.7 °F (37.6 °C)] 98.2 °F (36.8 °C)  Heart Rate:  [45-90] 86  Resp:  [14-18] 18  BP: ()/(42-89) 121/78  Mean Arterial Pressure (Non-Invasive) for the past 24 hrs (Last 3 readings):   Noninvasive MAP (mmHg)   02/25/25 1830 91   02/25/25 1815 90   02/25/25 1800 91     SpO2 Percentage    02/25/25  1800 02/25/25 1815 02/25/25 1830   SpO2: 90% 92% 93%     SpO2:  [90 %-96 %] 93 %  on   ;   Device (Oxygen Therapy): room air    Body mass index is 32.98 kg/m².  Wt Readings from Last 3 Encounters:   02/25/25 113 kg (250 lb)   10/12/23 113 kg (250 lb)   07/08/16 102 kg (225 lb)     ---------------------------------------------------------------------------------------------------------------------   Physical Exam:  Physical Exam  Constitutional:       General: He is not in acute distress.     Appearance: Normal appearance.   HENT:      Head: Normocephalic and atraumatic.      Right Ear: External ear normal.      Left Ear: External ear normal.      Nose: No nasal deformity.      Mouth/Throat:      Lips: Pink.      Mouth: Mucous membranes are moist.   Eyes:      Conjunctiva/sclera: Conjunctivae normal.      Pupils: Pupils are equal, round, and reactive to light.   Cardiovascular:      Rate and Rhythm: Normal rate and regular rhythm.      Pulses:           Dorsalis pedis pulses are 2+ on the right side and 2+ on the left side.      Heart sounds: Normal heart sounds.   Pulmonary:      Effort: Pulmonary effort is normal.      Breath sounds: Normal breath sounds. No wheezing, rhonchi or rales.   Abdominal:      General: Abdomen is flat. Bowel sounds are normal.      Palpations: Abdomen is soft.      Tenderness: There is no guarding or rebound.   Genitourinary:     Comments: No connolly catheter in place   Musculoskeletal:      Cervical back: Neck supple. Normal range of motion.      Right lower leg: No edema.      Left lower leg: No edema.   Skin:     General: Skin is warm and dry.   Neurological:      General: No focal deficit present.      Mental Status: He is alert and oriented to person, place, and time.   Psychiatric:         Mood and Affect: Mood normal.         Behavior: Behavior normal.       ---------------------------------------------------------------------------------------------------------------------  EKG:  "Left bundle-branch block.  No prior for comparison.      I have personally reviewed the EKG/Telemetry strip  ---------------------------------------------------------------------------------------------------------------------   Results from last 7 days   Lab Units 02/25/25  1729 02/25/25  1600   CK TOTAL U/L  --  446*   HSTROP T ng/L 22* 27*           Results from last 7 days   Lab Units 02/25/25  1600   WBC 10*3/mm3 6.13   HEMOGLOBIN g/dL 16.9   HEMATOCRIT % 48.3   MCV fL 91.1   MCHC g/dL 35.0   PLATELETS 10*3/mm3 125*     Results from last 7 days   Lab Units 02/25/25  1600 02/24/25  1402   SODIUM mmol/L 138 135*   POTASSIUM mmol/L 3.1* 3.6   CHLORIDE mmol/L 99 93*   CO2 mmol/L 28.9 30.1*   BUN mg/dL 29* 18   CREATININE mg/dL 1.73* 1.72*   CALCIUM mg/dL 8.3* 9.2   GLUCOSE mg/dL 96 121*   ALBUMIN g/dL 3.6 4.5   BILIRUBIN mg/dL 0.7 1.1   ALK PHOS U/L 44 56   AST (SGOT) U/L 56* 51*   ALT (SGPT) U/L 70* 78*   Estimated Creatinine Clearance: 68 mL/min (A) (by C-G formula based on SCr of 1.73 mg/dL (H)).  No results found for: \"AMMONIA\"    Glucose   Date/Time Value Ref Range Status   02/25/2025 1458 113 70 - 130 mg/dL Final     No results found for: \"HGBA1C\"  No results found for: \"TSH\", \"FREET4\"    No results found for: \"BLOODCX\"  No results found for: \"URINECX\"  No results found for: \"WOUNDCX\"  No results found for: \"STOOLCX\"  No results found for: \"RESPCX\"  No results found for: \"MRSACX\"  Pain Management Panel           No data to display              I have personally reviewed the above laboratory results.   ---------------------------------------------------------------------------------------------------------------------  Imaging Results (Last 7 Days)       ** No results found for the last 168 hours. **          I have personally reviewed the above radiology results.     Last Echocardiogram:  Results for orders placed during the hospital encounter of 07/02/20    Adult Transthoracic Echo Complete W/ Cont if " Necessary Per Protocol    Interpretation Summary  · Left ventricular wall thickness is consistent with mild-to-moderate concentric hypertrophy.  · Left ventricular systolic function is normal.  · Estimated EF appears to be in the range of 56 - 60%.  · Left ventricular diastolic dysfunction (grade I) consistent with impaired relaxation.  · Normal cardiac chambers dimensions.  · Mild aortic valve regurgitation is present.  · Mild mitral valve regurgitation is present  · Thetricuspid and pulmonic valves appear normal in structureand showed no regurgitations.  · There is no evidence of pericardial effusion.    ---------------------------------------------------------------------------------------------------------------------    Assessment & Plan      Active Hospital Problems    Diagnosis  POA    **Acute kidney injury [N17.9]  Yes     Acute Kidney Injury, POA  Hypokalemia  Presyncope  Left Bundle-branch block  Essential hypertension  Baseline Cr unknown, was up to 1.73  Continue mIVFs, Trend Cr and urine output, avoid nephrotoxins, NSAIDs, dehydration and contrast as able.  Repeat BMP in the a.m.   Hold patient's antihypertensives for now.  Can resume carvedilol if blood pressure remains stable.  Will hold lisinopril and chlorthalidone and plan to hold these on discharge.  Patient reports he has a follow-up with his PCP on Monday to repeat labs, advised him that he can discuss resuming versus changing these medications at that time.  Suspect presyncopal episode is due to the volume depletion.  The left bundle branch block was known per patient.  Denies any chest pain.  Initial troponin 27, repeat 22.  Suspect elevation is more likely secondary to DOREEN.  Continue to monitor on telemetry    F/E/N: IV NS at 100 mL/h.  Continue to monitor electrolytes and replace per protocol.  Regular diet.    ---------------------------------------------------  DVT Prophylaxis: Subcu heparin  Activity: Ad chata    The patient is considered  to be a high risk patient due to: DOREEN    OBSERVATION status, however if further evaluation or treatment plans warrant, status may change.  Based upon current information, I predict patient's care encounter to be less than or equal to 2 midnights.     Code Status: Full code    Disposition/Discharge planning: Pending clinical course, likely discharge in the next 24 hours    I have discussed the patient's assessment and plan with Dr. Nadeem Wynn PA-C  Hospitalist Service -- [facility]       02/25/25  18:50 EST    Attending Physician: Dr. Silver

## 2025-02-26 VITALS
WEIGHT: 251.1 LBS | DIASTOLIC BLOOD PRESSURE: 86 MMHG | OXYGEN SATURATION: 95 % | BODY MASS INDEX: 34.01 KG/M2 | TEMPERATURE: 98.6 F | RESPIRATION RATE: 18 BRPM | HEART RATE: 74 BPM | SYSTOLIC BLOOD PRESSURE: 144 MMHG | HEIGHT: 72 IN

## 2025-02-26 LAB
ANION GAP SERPL CALCULATED.3IONS-SCNC: 9.2 MMOL/L (ref 5–15)
BUN SERPL-MCNC: 26 MG/DL (ref 6–20)
BUN/CREAT SERPL: 17.1 (ref 7–25)
CALCIUM SPEC-SCNC: 7.7 MG/DL (ref 8.6–10.5)
CHLORIDE SERPL-SCNC: 98 MMOL/L (ref 98–107)
CO2 SERPL-SCNC: 27.8 MMOL/L (ref 22–29)
CREAT SERPL-MCNC: 1.52 MG/DL (ref 0.76–1.27)
DEPRECATED RDW RBC AUTO: 42.4 FL (ref 37–54)
EGFRCR SERPLBLD CKD-EPI 2021: 55.8 ML/MIN/1.73
ERYTHROCYTE [DISTWIDTH] IN BLOOD BY AUTOMATED COUNT: 12.3 % (ref 12.3–15.4)
FLUAV RNA RESP QL NAA+PROBE: DETECTED
FLUBV RNA RESP QL NAA+PROBE: NOT DETECTED
GLUCOSE SERPL-MCNC: 102 MG/DL (ref 65–99)
HCT VFR BLD AUTO: 46.3 % (ref 37.5–51)
HGB BLD-MCNC: 15.9 G/DL (ref 13–17.7)
MAGNESIUM SERPL-MCNC: 1.9 MG/DL (ref 1.6–2.6)
MCH RBC QN AUTO: 32 PG (ref 26.6–33)
MCHC RBC AUTO-ENTMCNC: 34.3 G/DL (ref 31.5–35.7)
MCV RBC AUTO: 93.2 FL (ref 79–97)
PLATELET # BLD AUTO: 122 10*3/MM3 (ref 140–450)
PMV BLD AUTO: 10.5 FL (ref 6–12)
POTASSIUM SERPL-SCNC: 3 MMOL/L (ref 3.5–5.2)
POTASSIUM SERPL-SCNC: 3.6 MMOL/L (ref 3.5–5.2)
QT INTERVAL: 474 MS
QTC INTERVAL: 543 MS
RBC # BLD AUTO: 4.97 10*6/MM3 (ref 4.14–5.8)
SARS-COV-2 RNA RESP QL NAA+PROBE: NOT DETECTED
SODIUM SERPL-SCNC: 135 MMOL/L (ref 136–145)
WBC NRBC COR # BLD AUTO: 6 10*3/MM3 (ref 3.4–10.8)

## 2025-02-26 PROCEDURE — 83735 ASSAY OF MAGNESIUM: CPT | Performed by: HOSPITALIST

## 2025-02-26 PROCEDURE — 25010000002 HEPARIN (PORCINE) PER 1000 UNITS

## 2025-02-26 PROCEDURE — G0378 HOSPITAL OBSERVATION PER HR: HCPCS

## 2025-02-26 PROCEDURE — 36415 COLL VENOUS BLD VENIPUNCTURE: CPT

## 2025-02-26 PROCEDURE — 96366 THER/PROPH/DIAG IV INF ADDON: CPT

## 2025-02-26 PROCEDURE — 99238 HOSP IP/OBS DSCHRG MGMT 30/<: CPT | Performed by: FAMILY MEDICINE

## 2025-02-26 PROCEDURE — 25810000003 SODIUM CHLORIDE 0.9 % SOLUTION

## 2025-02-26 PROCEDURE — 96361 HYDRATE IV INFUSION ADD-ON: CPT

## 2025-02-26 PROCEDURE — 80048 BASIC METABOLIC PNL TOTAL CA: CPT

## 2025-02-26 PROCEDURE — 84132 ASSAY OF SERUM POTASSIUM: CPT | Performed by: HOSPITALIST

## 2025-02-26 PROCEDURE — 96372 THER/PROPH/DIAG INJ SC/IM: CPT

## 2025-02-26 PROCEDURE — 96367 TX/PROPH/DG ADDL SEQ IV INF: CPT

## 2025-02-26 PROCEDURE — 85027 COMPLETE CBC AUTOMATED: CPT

## 2025-02-26 RX ORDER — POTASSIUM CHLORIDE 1500 MG/1
40 TABLET, EXTENDED RELEASE ORAL EVERY 4 HOURS
Status: COMPLETED | OUTPATIENT
Start: 2025-02-26 | End: 2025-02-26

## 2025-02-26 RX ORDER — INSULIN LISPRO 100 [IU]/ML
2-7 INJECTION, SOLUTION INTRAVENOUS; SUBCUTANEOUS
Status: DISCONTINUED | OUTPATIENT
Start: 2025-02-26 | End: 2025-02-26

## 2025-02-26 RX ORDER — GLUCAGON 1 MG/ML
1 KIT INJECTION
Status: DISCONTINUED | OUTPATIENT
Start: 2025-02-26 | End: 2025-02-26

## 2025-02-26 RX ORDER — POTASSIUM CHLORIDE 1500 MG/1
20 TABLET, EXTENDED RELEASE ORAL DAILY
Qty: 7 TABLET | Refills: 0 | Status: SHIPPED | OUTPATIENT
Start: 2025-02-26

## 2025-02-26 RX ORDER — NICOTINE POLACRILEX 4 MG
15 LOZENGE BUCCAL
Status: DISCONTINUED | OUTPATIENT
Start: 2025-02-26 | End: 2025-02-26

## 2025-02-26 RX ORDER — CARVEDILOL 6.25 MG/1
6.25 TABLET ORAL 2 TIMES DAILY WITH MEALS
Status: DISCONTINUED | OUTPATIENT
Start: 2025-02-26 | End: 2025-02-26 | Stop reason: HOSPADM

## 2025-02-26 RX ORDER — DEXTROSE MONOHYDRATE 25 G/50ML
25 INJECTION, SOLUTION INTRAVENOUS
Status: DISCONTINUED | OUTPATIENT
Start: 2025-02-26 | End: 2025-02-26

## 2025-02-26 RX ADMIN — POTASSIUM CHLORIDE 40 MEQ: 1500 TABLET, EXTENDED RELEASE ORAL at 06:57

## 2025-02-26 RX ADMIN — SODIUM CHLORIDE 100 ML/HR: 9 INJECTION, SOLUTION INTRAVENOUS at 06:59

## 2025-02-26 RX ADMIN — CARVEDILOL 6.25 MG: 6.25 TABLET, FILM COATED ORAL at 10:21

## 2025-02-26 RX ADMIN — POTASSIUM CHLORIDE 40 MEQ: 1500 TABLET, EXTENDED RELEASE ORAL at 02:12

## 2025-02-26 RX ADMIN — HEPARIN SODIUM 5000 UNITS: 5000 INJECTION INTRAVENOUS; SUBCUTANEOUS at 10:22

## 2025-02-26 RX ADMIN — Medication 10 ML: at 10:22

## 2025-02-26 RX ADMIN — POTASSIUM CHLORIDE 40 MEQ: 1500 TABLET, EXTENDED RELEASE ORAL at 10:21

## 2025-02-26 NOTE — PLAN OF CARE
Patient resting in the bed throughout the night. No s/s of distress noted. No complaints. Will continue to follow plan of care.

## 2025-02-26 NOTE — DISCHARGE SUMMARY
[facility] HOSPITALISTS DISCHARGE SUMMARY    Patient Identification:  Name:  Jose Juan Cabrera  Age:  49 y.o.  Sex:  male  :  1975  MRN:  [id number]  Visit Number:  [id number]    Date of Admission: 2025  Date of Discharge:  2025     PCP: Fortunato Millan PA    DISCHARGE DIAGNOSIS  Acute kidney injury  Hypokalemia  History of hypertension    CONSULTS       PROCEDURES PERFORMED      HOSPITAL COURSE  Patient is a 49 y.o. male [facility] with a past history significant for hypertension.  Patient presented to the ED because of hypotension and near syncope.  Patient states he then had it at his PCPs office recently and was told his creatinine was high and was told to come in to get IV fluids.  He denies any history of chronic kidney disease.  Patient states he did have 1 episode of vomiting over the weekend and had some bodyaches and generalized malaise and had tested positive for flu.  Patient is typically on ACE inhibitor and diuretic.  Patient was seen in the outpatient infusion clinic and had a near syncopal episode and was hypotensive.  Patient was brought into the emergency department did get a fluid bolus while in the ED and was told he had a left bundle branch block but states he states that this is old.  Patient had a creatinine of 1.73.  Patient was given IV fluids and was admitted to this to our service for observation.  This morning patient's creatinine had improved to 1.52 but his potassium level was 3.  Patient was placed on potassium replacement.  Patient's potassium improved to 3.6 and was felt we could discharge patient to home as he was feeling well.  Will arrange for patient to have follow-up with new primary care provider per his request of Dr. Woodson.  Patient has had ACE inhibitor and diuretic discontinued during admission.      VITAL SIGNS:  Temp:  [97.6 °F (36.4 °C)-98.6 °F (37 °C)] 98.6 °F (37 °C)  Heart Rate:  [53-89] 74  Resp:  [18] 18  BP:  ()/(76-86) 144/86  SpO2:  [90 %-98 %] 95 %  on   ;   Device (Oxygen Therapy): room air    Body mass index is 34.06 kg/m².  Wt Readings from Last 3 Encounters:   02/26/25 114 kg (251 lb 1.6 oz)   10/12/23 113 kg (250 lb)   07/08/16 102 kg (225 lb)       PHYSICAL EXAM:  Constitutional: No acute distress  HEENT: Cephalic atraumatic  Neck: Supple  Cardiovascular: Regular rate and rhythm  Pulmonary/Chest: Clear to auscultation  Abdominal:  .  Positive bowel sounds soft  Musculoskeletal: No arthropathy  Neurological: No focal deficits  Skin: No rash  Peripheral vascular: No edema  Genitourinary::    DISCHARGE DISPOSITION   Stable    DISCHARGE MEDICATIONS:     Discharge Medications        New Medications        Instructions Start Date   potassium chloride 20 MEQ CR tablet  Commonly known as: KLOR-CON M20   20 mEq, Oral, Daily             Continue These Medications        Instructions Start Date   carvedilol 6.25 MG tablet  Commonly known as: COREG   6.25 mg, Oral, 2 Times Daily With Meals             Stop These Medications      chlorthalidone 25 MG tablet  Commonly known as: HYGROTON     lisinopril 20 MG tablet  Commonly known as: PRINIVIL,ZESTRIL               Your medication list        START taking these medications        Instructions Last Dose Given Next Dose Due   potassium chloride 20 MEQ CR tablet  Commonly known as: KLOR-CON M20      Take 1 tablet by mouth Daily.              CONTINUE taking these medications        Instructions Last Dose Given Next Dose Due   carvedilol 6.25 MG tablet  Commonly known as: COREG      Take 1 tablet by mouth 2 (Two) Times a Day With Meals.              STOP taking these medications      chlorthalidone 25 MG tablet  Commonly known as: HYGROTON        lisinopril 20 MG tablet  Commonly known as: PRINIVIL,ZESTRIL                  Where to Get Your Medications        These medications were sent to [facility] - [place] - [address] - [phone] [facility] [phone] FX   [address] [place] [address]      Phone: [phone]   potassium chloride 20 MEQ CR tablet         Diet Instructions       Diet: Regular/House Diet; Regular (IDDSI 7); Thin (IDDSI 0)      Discharge Diet: Regular/House Diet    Texture: Regular (IDDSI 7)    Fluid Consistency: Thin (IDDSI 0)          No future appointments.  Additional Instructions for the Follow-ups that You Need to Schedule       Discharge Follow-up with Specified Provider: Dr Ruddy Woodson; 1 Week   As directed      To: Dr Ruddy Woodson   Follow Up: 1 Week   Follow Up Details: to establish care//folllowup maria a secondary to acei//hypokalemia               Follow-up Information       Ruddy Woodson, DO Follow up in 1 week(s).    Specialties: Family Medicine, Hospitalist  Why: PATIENT WILL BE NOTIFIED ABOUT APPT ON THURS 2/27 AM  Contact information:  [address] [place] [address]  [phone]                              TEST  RESULTS PENDING AT DISCHARGE       CODE STATUS  Code Status and Medical Interventions: CPR (Attempt to Resuscitate); Full Support   Ordered at: 02/25/25 1841     Code Status (Patient has no pulse and is not breathing):    CPR (Attempt to Resuscitate)     Medical Interventions (Patient has pulse or is breathing):    Full Support       Gen Silver MD  [facility] Hospitalist  02/26/25  17:41 EST    Please note that this discharge summary required less than 30 minutes to complete.

## 2025-02-26 NOTE — PLAN OF CARE
Goal Outcome Evaluation:  Patient is resting in bed watching television. Patient has tolerated all interventions. No complaints or concerns at this time. No signs of acute distress noted. Will continue to follow plan of care.

## 2025-02-27 ENCOUNTER — READMISSION MANAGEMENT (OUTPATIENT)
Dept: CALL CENTER | Facility: HOSPITAL | Age: 50
End: 2025-02-27
Payer: COMMERCIAL

## 2025-02-28 ENCOUNTER — TRANSITIONAL CARE MANAGEMENT TELEPHONE ENCOUNTER (OUTPATIENT)
Dept: CALL CENTER | Facility: HOSPITAL | Age: 50
End: 2025-02-28
Payer: COMMERCIAL

## 2025-02-28 NOTE — OUTREACH NOTE
Prep Survey      Flowsheet Row Responses   [facility] patient discharged from? [place]   Is LACE score < 7 ? Yes   Eligibility [facility]   Date of Admission 02/25/25   Date of Discharge 02/26/25   Discharge Disposition Home or Self Care   Discharge diagnosis Acute kidney injury   Does the patient have one of the following disease processes/diagnoses(primary or secondary)? Other   Does the patient have Home health ordered? No   Is there a DME ordered? No   Comments regarding appointments new PCP appt   Prep survey completed? Yes            Jenelle Zapien Registered Nurse

## 2025-02-28 NOTE — OUTREACH NOTE
Call Center TCM Note      Flowsheet Row Responses   [facility] patient discharged from? [place]   Does the patient have one of the following disease processes/diagnoses(primary or secondary)? Other   TCM attempt successful? Yes   Call start time 1448   Call end time 1450   List who call center can speak with pt   Meds reviewed with patient/caregiver? Yes   Is the patient having any side effects they believe may be caused by any medication additions or changes? No   Does the patient have all medications ordered at discharge? Yes   Is the patient taking all medications as directed (includes completed medication regime)? Yes   Comments Hospital f/u appointment scheduled for 3- @ 8:45 am.   Does the patient have an appointment with their PCP within 7-14 days of discharge? Yes   Has home health visited the patient within 72 hours of discharge? N/A   Psychosocial issues? No   Did the patient receive a copy of their discharge instructions? Yes   Nursing interventions Reviewed instructions with patient   What is the patient's perception of their health status since discharge? Improving   Is the patient/caregiver able to teach back signs and symptoms related to disease process for when to call PCP? Yes   Is the patient/caregiver able to teach back signs and symptoms related to disease process for when to call 911? Yes   Is the patient/caregiver able to teach back the hierarchy of who to call/visit for symptoms/problems? PCP, Specialist, Home health nurse, Urgent Care, ED, 911 Yes   TCM call completed? Yes   Wrap up additional comments Pt reports feeling well. Pt has al medications.   Call end time 1450   Would this patient benefit from a Referral to Amb Social Work? No   Is the patient interested in additional calls from an ambulatory ? No            Umu Lyle, MELECIO    2/28/2025, 14:52 EST

## 2025-03-06 ENCOUNTER — LAB (OUTPATIENT)
Dept: FAMILY MEDICINE CLINIC | Facility: CLINIC | Age: 50
End: 2025-03-06
Payer: COMMERCIAL

## 2025-03-06 ENCOUNTER — OFFICE VISIT (OUTPATIENT)
Dept: FAMILY MEDICINE CLINIC | Facility: CLINIC | Age: 50
End: 2025-03-06
Payer: COMMERCIAL

## 2025-03-06 ENCOUNTER — TELEPHONE (OUTPATIENT)
Dept: FAMILY MEDICINE CLINIC | Age: 50
End: 2025-03-06

## 2025-03-06 ENCOUNTER — OFFICE VISIT (OUTPATIENT)
Dept: FAMILY MEDICINE CLINIC | Age: 50
End: 2025-03-06
Payer: COMMERCIAL

## 2025-03-06 VITALS
TEMPERATURE: 96.9 F | DIASTOLIC BLOOD PRESSURE: 112 MMHG | HEIGHT: 73 IN | WEIGHT: 259.6 LBS | OXYGEN SATURATION: 97 % | HEART RATE: 74 BPM | SYSTOLIC BLOOD PRESSURE: 152 MMHG | BODY MASS INDEX: 34.4 KG/M2

## 2025-03-06 VITALS
RESPIRATION RATE: 18 BRPM | WEIGHT: 257.6 LBS | HEART RATE: 80 BPM | OXYGEN SATURATION: 98 % | DIASTOLIC BLOOD PRESSURE: 108 MMHG | BODY MASS INDEX: 33.99 KG/M2 | SYSTOLIC BLOOD PRESSURE: 160 MMHG

## 2025-03-06 DIAGNOSIS — Z13.6 SCREENING FOR CARDIOVASCULAR CONDITION: ICD-10-CM

## 2025-03-06 DIAGNOSIS — N17.9 ACUTE KIDNEY INJURY: ICD-10-CM

## 2025-03-06 DIAGNOSIS — R79.89 ELEVATED SERUM CREATININE: ICD-10-CM

## 2025-03-06 DIAGNOSIS — R79.89 ELEVATED LFTS: ICD-10-CM

## 2025-03-06 DIAGNOSIS — R05.1 ACUTE COUGH: ICD-10-CM

## 2025-03-06 DIAGNOSIS — E66.811 OBESITY (BMI 30.0-34.9): Primary | ICD-10-CM

## 2025-03-06 DIAGNOSIS — I10 HYPERTENSION, UNSPECIFIED TYPE: ICD-10-CM

## 2025-03-06 DIAGNOSIS — R73.9 HYPERGLYCEMIA: ICD-10-CM

## 2025-03-06 DIAGNOSIS — I44.7 LEFT BUNDLE BRANCH BLOCK: ICD-10-CM

## 2025-03-06 DIAGNOSIS — R73.01 ELEVATED FASTING GLUCOSE: ICD-10-CM

## 2025-03-06 DIAGNOSIS — G47.10 HYPERSOMNIA: ICD-10-CM

## 2025-03-06 DIAGNOSIS — I10 PRIMARY HYPERTENSION: Primary | ICD-10-CM

## 2025-03-06 LAB
ALBUMIN SERPL-MCNC: 4.3 G/DL (ref 3.5–5.2)
ANION GAP SERPL CALCULATED.3IONS-SCNC: 8 MMOL/L (ref 5–15)
BUN SERPL-MCNC: 21 MG/DL (ref 6–20)
BUN/CREAT SERPL: 15.2 (ref 7–25)
CALCIUM SPEC-SCNC: 9.3 MG/DL (ref 8.6–10.5)
CHLORIDE SERPL-SCNC: 104 MMOL/L (ref 98–107)
CHOLEST SERPL-MCNC: 124 MG/DL (ref 0–200)
CO2 SERPL-SCNC: 27 MMOL/L (ref 22–29)
CREAT SERPL-MCNC: 1.38 MG/DL (ref 0.76–1.27)
CREAT UR-MCNC: 158.5 MG/DL
DEPRECATED RDW RBC AUTO: 43.6 FL (ref 37–54)
EGFRCR SERPLBLD CKD-EPI 2021: 62.7 ML/MIN/1.73
ERYTHROCYTE [DISTWIDTH] IN BLOOD BY AUTOMATED COUNT: 12.5 % (ref 12.3–15.4)
GLUCOSE SERPL-MCNC: 90 MG/DL (ref 65–99)
HCT VFR BLD AUTO: 48.8 % (ref 37.5–51)
HDLC SERPL-MCNC: 33 MG/DL (ref 40–60)
HGB BLD-MCNC: 16.9 G/DL (ref 13–17.7)
LDLC SERPL CALC-MCNC: 75 MG/DL (ref 0–100)
LDLC/HDLC SERPL: 2.25 {RATIO}
MCH RBC QN AUTO: 32.6 PG (ref 26.6–33)
MCHC RBC AUTO-ENTMCNC: 34.6 G/DL (ref 31.5–35.7)
MCV RBC AUTO: 94.2 FL (ref 79–97)
PHOSPHATE SERPL-MCNC: 3.5 MG/DL (ref 2.5–4.5)
PLATELET # BLD AUTO: 263 10*3/MM3 (ref 140–450)
PMV BLD AUTO: 11.4 FL (ref 6–12)
POTASSIUM SERPL-SCNC: 4.9 MMOL/L (ref 3.5–5.2)
PROT ?TM UR-MCNC: 8.4 MG/DL
PROT/CREAT UR: 53 MG/G CREA (ref 0–200)
RBC # BLD AUTO: 5.18 10*6/MM3 (ref 4.14–5.8)
SODIUM SERPL-SCNC: 139 MMOL/L (ref 136–145)
TRIGL SERPL-MCNC: 83 MG/DL (ref 0–150)
VLDLC SERPL-MCNC: 16 MG/DL (ref 5–40)
WBC NRBC COR # BLD AUTO: 5.15 10*3/MM3 (ref 3.4–10.8)

## 2025-03-06 PROCEDURE — 80069 RENAL FUNCTION PANEL: CPT | Performed by: STUDENT IN AN ORGANIZED HEALTH CARE EDUCATION/TRAINING PROGRAM

## 2025-03-06 PROCEDURE — 85027 COMPLETE CBC AUTOMATED: CPT | Performed by: STUDENT IN AN ORGANIZED HEALTH CARE EDUCATION/TRAINING PROGRAM

## 2025-03-06 PROCEDURE — 80061 LIPID PANEL: CPT | Performed by: STUDENT IN AN ORGANIZED HEALTH CARE EDUCATION/TRAINING PROGRAM

## 2025-03-06 PROCEDURE — 36415 COLL VENOUS BLD VENIPUNCTURE: CPT

## 2025-03-06 PROCEDURE — 82570 ASSAY OF URINE CREATININE: CPT | Performed by: STUDENT IN AN ORGANIZED HEALTH CARE EDUCATION/TRAINING PROGRAM

## 2025-03-06 PROCEDURE — 84156 ASSAY OF PROTEIN URINE: CPT | Performed by: STUDENT IN AN ORGANIZED HEALTH CARE EDUCATION/TRAINING PROGRAM

## 2025-03-06 RX ORDER — LISINOPRIL 20 MG/1
20 TABLET ORAL DAILY
COMMUNITY

## 2025-03-06 RX ORDER — CHLORTHALIDONE 25 MG/1
12.5 TABLET ORAL DAILY
COMMUNITY

## 2025-03-06 NOTE — PROGRESS NOTES
Chief Complaint  Weight Gain    Subjective            History of Present Illness  Jose Juan Cabrera is a 49 y.o. male who presents to [facility] today with interest in the weight loss program with a BMI of Body mass index is 33.99 kg/m².. Patient is a candidate for this program due to BMI of Obese Class I: 30-34.9kg/m2. Patient denies a personal history of pancreatitis. Patient denies a personal history of gastroparesis. Patient also denies a personal history of gallbladder disease.  Patient denies personal and  family history of medullary thyroid cancer and multiple endocrine neoplasia syndrome Type II. Previous diet and exercise plans attempted include taking a previous compounded semaglutide approximately one year ago. It did help him to lose weight and allowed him to decrease his blood pressure medications from three different medications to one. Since being off of the medication, he has regained weight and placed on 3 different antihypertensives. However patient had a recent episode of hypotension and was evaluated in the ER. In addition he changed primary care doctors, was told to stop antihypertensive meds for 3 days and have labs drawn. Patient had repeat labs this am. After being off of his meds for 3 days, his BP was high. Denied CP, SOB, GARVIN, jaw pain, arm pain, back pain or headache.     Review of Systems   Constitutional:  Positive for appetite change.   HENT: Negative.     Eyes: Negative.    Respiratory: Negative.     Cardiovascular: Negative.    Gastrointestinal: Negative.    Endocrine: Negative.    Genitourinary: Negative.    Musculoskeletal: Negative.    Allergic/Immunologic: Negative.    Neurological: Negative.    Hematological: Negative.    Psychiatric/Behavioral: Negative.          Past Medical History:   Diagnosis Date    Ankle fracture     Fracture of wrist     Hypertension     Leg fracture      Past Surgical History:   Procedure Laterality Date    COLONOSCOPY N/A  "10/12/2023    Procedure: COLONOSCOPY FOR SCREENING;  Surgeon: Tanya Vicente MD;  Location: [facility];  Service: Gastroenterology;  Laterality: N/A;    MANDIBLE SURGERY        Family History   Problem Relation Age of Onset    Rheumatologic disease Father     Hypertension Father     Cancer Maternal Grandmother     Collagen disease Maternal Grandmother       Social History     Socioeconomic History    Marital status:    Tobacco Use    Smoking status: Never    Smokeless tobacco: Never   Vaping Use    Vaping status: Never Used   Substance and Sexual Activity    Alcohol use: Yes     Comment: occ    Drug use: No    Sexual activity: Defer      No Known Allergies   Current Outpatient Medications on File Prior to Visit   Medication Sig Dispense Refill    carvedilol (COREG) 6.25 MG tablet Take 1 tablet by mouth 2 (Two) Times a Day With Meals. 60 tablet 3    chlorthalidone (HYGROTON) 25 MG tablet Take 0.5 tablets by mouth Daily.      lisinopril (PRINIVIL,ZESTRIL) 20 MG tablet Take 1 tablet by mouth Daily.      [DISCONTINUED] potassium chloride (KLOR-CON M20) 20 MEQ CR tablet Take 1 tablet by mouth Daily. 7 tablet 0     No current facility-administered medications on file prior to visit.        The following portions of the patient's history were reviewed and updated as appropriate: allergies, current medications, past family history, past social history, past medical history, past surgical history and problem list.     Objective   Vital Signs:  Vitals:    03/06/25 1108 03/06/25 1145   BP: (!) 171/117 (!) 160/108   BP Location: Right arm Left arm   Patient Position: Sitting Sitting   Cuff Size: Small Adult Large Adult   Pulse: 76 80   Resp:  18   SpO2: 98%    Weight: 117 kg (257 lb 9.6 oz)       Estimated body mass index is 33.99 kg/m² as calculated from the following:    Height as of an earlier encounter on 3/6/25: 185.4 cm (73\").    Weight as of this encounter: 117 kg (257 lb 9.6 oz).               Physical " Exam  Vitals reviewed.   Constitutional:       Appearance: He is obese.   HENT:      Head: Normocephalic.      Nose: Nose normal.      Mouth/Throat:      Mouth: Mucous membranes are moist.      Pharynx: Oropharynx is clear.   Eyes:      Extraocular Movements: Extraocular movements intact.      Pupils: Pupils are equal, round, and reactive to light.   Cardiovascular:      Rate and Rhythm: Normal rate and regular rhythm.      Pulses: Normal pulses.   Pulmonary:      Effort: Pulmonary effort is normal.      Breath sounds: Normal breath sounds.   Abdominal:      General: Abdomen is flat. Bowel sounds are normal.      Palpations: Abdomen is soft.      Hernia: No hernia is present.   Musculoskeletal:         General: Normal range of motion.      Cervical back: Normal range of motion.   Skin:     General: Skin is warm and dry.   Neurological:      General: No focal deficit present.      Mental Status: He is alert and oriented to person, place, and time. Mental status is at baseline.   Psychiatric:         Mood and Affect: Mood normal.         Thought Content: Thought content normal.         Judgment: Judgment normal.          Admission on 02/25/2025, Discharged on 02/26/2025   Component Date Value Ref Range Status    Glucose 02/25/2025 96  65 - 99 mg/dL Final    BUN 02/25/2025 29 (H)  6 - 20 mg/dL Final    Creatinine 02/25/2025 1.73 (H)  0.76 - 1.27 mg/dL Final    Sodium 02/25/2025 138  136 - 145 mmol/L Final    Potassium 02/25/2025 3.1 (L)  3.5 - 5.2 mmol/L Final    Chloride 02/25/2025 99  98 - 107 mmol/L Final    CO2 02/25/2025 28.9  22.0 - 29.0 mmol/L Final    Calcium 02/25/2025 8.3 (L)  8.6 - 10.5 mg/dL Final    Total Protein 02/25/2025 6.2  6.0 - 8.5 g/dL Final    Albumin 02/25/2025 3.6  3.5 - 5.2 g/dL Final    ALT (SGPT) 02/25/2025 70 (H)  1 - 41 U/L Final    AST (SGOT) 02/25/2025 56 (H)  1 - 40 U/L Final    Alkaline Phosphatase 02/25/2025 44  39 - 117 U/L Final    Total Bilirubin 02/25/2025 0.7  0.0 - 1.2 mg/dL  Final    Globulin 02/25/2025 2.6  gm/dL Final    A/G Ratio 02/25/2025 1.4  g/dL Final    BUN/Creatinine Ratio 02/25/2025 16.8  7.0 - 25.0 Final    Anion Gap 02/25/2025 10.1  5.0 - 15.0 mmol/L Final    eGFR 02/25/2025 47.8 (L)  >60.0 mL/min/1.73 Final    HS Troponin T 02/25/2025 27 (H)  <22 ng/L Final    D-Dimer, Quantitative 02/25/2025 0.27  0.00 - 0.50 MCGFEU/mL Final    QT Interval 02/25/2025 474  ms Final    QTC Interval 02/25/2025 543  ms Final    WBC 02/25/2025 6.13  3.40 - 10.80 10*3/mm3 Final    RBC 02/25/2025 5.30  4.14 - 5.80 10*6/mm3 Final    Hemoglobin 02/25/2025 16.9  13.0 - 17.7 g/dL Final    Hematocrit 02/25/2025 48.3  37.5 - 51.0 % Final    MCV 02/25/2025 91.1  79.0 - 97.0 fL Final    MCH 02/25/2025 31.9  26.6 - 33.0 pg Final    MCHC 02/25/2025 35.0  31.5 - 35.7 g/dL Final    RDW 02/25/2025 12.3  12.3 - 15.4 % Final    RDW-SD 02/25/2025 40.8  37.0 - 54.0 fl Final    MPV 02/25/2025 10.3  6.0 - 12.0 fL Final    Platelets 02/25/2025 125 (L)  140 - 450 10*3/mm3 Final    Neutrophil % 02/25/2025 49.6  42.7 - 76.0 % Final    Lymphocyte % 02/25/2025 33.4  19.6 - 45.3 % Final    Monocyte % 02/25/2025 16.2 (H)  5.0 - 12.0 % Final    Eosinophil % 02/25/2025 0.3  0.3 - 6.2 % Final    Basophil % 02/25/2025 0.3  0.0 - 1.5 % Final    Immature Grans % 02/25/2025 0.2  0.0 - 0.5 % Final    Neutrophils, Absolute 02/25/2025 3.04  1.70 - 7.00 10*3/mm3 Final    Lymphocytes, Absolute 02/25/2025 2.05  0.70 - 3.10 10*3/mm3 Final    Monocytes, Absolute 02/25/2025 0.99 (H)  0.10 - 0.90 10*3/mm3 Final    Eosinophils, Absolute 02/25/2025 0.02  0.00 - 0.40 10*3/mm3 Final    Basophils, Absolute 02/25/2025 0.02  0.00 - 0.20 10*3/mm3 Final    Immature Grans, Absolute 02/25/2025 0.01  0.00 - 0.05 10*3/mm3 Final    nRBC 02/25/2025 0.0  0.0 - 0.2 /100 WBC Final    HS Troponin T 02/25/2025 22 (H)  <22 ng/L Final    Troponin T Numeric Delta 02/25/2025 -5  ng/L Final    Troponin T % Delta 02/25/2025 -19  Abnormal if >/= 20% Final     Creatine Kinase 02/25/2025 446 (H)  20 - 200 U/L Final    Magnesium 02/25/2025 1.8  1.6 - 2.6 mg/dL Final    Glucose 02/26/2025 102 (H)  65 - 99 mg/dL Final    BUN 02/26/2025 26 (H)  6 - 20 mg/dL Final    Creatinine 02/26/2025 1.52 (H)  0.76 - 1.27 mg/dL Final    Sodium 02/26/2025 135 (L)  136 - 145 mmol/L Final    Potassium 02/26/2025 3.0 (L)  3.5 - 5.2 mmol/L Final    Slight hemolysis detected by analyzer. Result may be falsely elevated.    Chloride 02/26/2025 98  98 - 107 mmol/L Final    CO2 02/26/2025 27.8  22.0 - 29.0 mmol/L Final    Calcium 02/26/2025 7.7 (L)  8.6 - 10.5 mg/dL Final    BUN/Creatinine Ratio 02/26/2025 17.1  7.0 - 25.0 Final    Anion Gap 02/26/2025 9.2  5.0 - 15.0 mmol/L Final    eGFR 02/26/2025 55.8 (L)  >60.0 mL/min/1.73 Final    WBC 02/26/2025 6.00  3.40 - 10.80 10*3/mm3 Final    RBC 02/26/2025 4.97  4.14 - 5.80 10*6/mm3 Final    Hemoglobin 02/26/2025 15.9  13.0 - 17.7 g/dL Final    Hematocrit 02/26/2025 46.3  37.5 - 51.0 % Final    MCV 02/26/2025 93.2  79.0 - 97.0 fL Final    MCH 02/26/2025 32.0  26.6 - 33.0 pg Final    MCHC 02/26/2025 34.3  31.5 - 35.7 g/dL Final    RDW 02/26/2025 12.3  12.3 - 15.4 % Final    RDW-SD 02/26/2025 42.4  37.0 - 54.0 fl Final    MPV 02/26/2025 10.5  6.0 - 12.0 fL Final    Platelets 02/26/2025 122 (L)  140 - 450 10*3/mm3 Final    Magnesium 02/26/2025 1.9  1.6 - 2.6 mg/dL Final    COVID19 02/25/2025 Not Detected  Not Detected - Ref. Range Final    Influenza A PCR 02/25/2025 Detected (A)  Not Detected Final    Influenza B PCR 02/25/2025 Not Detected  Not Detected Final    Potassium 02/26/2025 3.6  3.5 - 5.2 mmol/L Final    Slight hemolysis detected by analyzer. Result may be falsely elevated.   Infusion on 02/25/2025   Component Date Value Ref Range Status    Glucose 02/25/2025 113  70 - 130 mg/dL Final   Transcribe Orders on 02/24/2025   Component Date Value Ref Range Status    Glucose 02/24/2025 121 (H)  65 - 99 mg/dL Final    BUN 02/24/2025 18  6 - 20 mg/dL Final     Creatinine 02/24/2025 1.72 (H)  0.76 - 1.27 mg/dL Final    Sodium 02/24/2025 135 (L)  136 - 145 mmol/L Final    Potassium 02/24/2025 3.6  3.5 - 5.2 mmol/L Final    Slight hemolysis detected by analyzer. Result may be falsely elevated.    Chloride 02/24/2025 93 (L)  98 - 107 mmol/L Final    CO2 02/24/2025 30.1 (H)  22.0 - 29.0 mmol/L Final    Calcium 02/24/2025 9.2  8.6 - 10.5 mg/dL Final    Total Protein 02/24/2025 7.4  6.0 - 8.5 g/dL Final    Albumin 02/24/2025 4.5  3.5 - 5.2 g/dL Final    ALT (SGPT) 02/24/2025 78 (H)  1 - 41 U/L Final    AST (SGOT) 02/24/2025 51 (H)  1 - 40 U/L Final    Alkaline Phosphatase 02/24/2025 56  39 - 117 U/L Final    Total Bilirubin 02/24/2025 1.1  0.0 - 1.2 mg/dL Final    Globulin 02/24/2025 2.9  gm/dL Final    A/G Ratio 02/24/2025 1.6  g/dL Final    BUN/Creatinine Ratio 02/24/2025 10.5  7.0 - 25.0 Final    Anion Gap 02/24/2025 11.9  5.0 - 15.0 mmol/L Final    eGFR 02/24/2025 48.1 (L)  >60.0 mL/min/1.73 Final   Lab Requisition on 12/23/2024   Component Date Value Ref Range Status    Rubella Antibodies, IgG 12/23/2024 10.00  Immune >0.99 index Final                                    Non-immune       <0.90                                  Equivocal  0.90 - 0.99                                  Immune           >0.99    Rubeola IgG 12/23/2024 <13.5 (L)  Immune >16.4 AU/mL Final                                     Negative        <13.5                                   Equivocal 13.5 - 16.4                                   Positive        >16.4  Presence of antibodies to Rubeola is presumptive evidence  of immunity except when acute infection is suspected.    Varicella IgG 12/23/2024 Reactive  Non Reactive Final    **Please note reference interval change**  A Reactive result is considered evidence of immunity to VZV.  Reactive indicates that VZV IgG was detected consistent with  previous infection and/or vaccination.  A Non Reactive result indicates that VZV IgG was not  detected  suggesting that immunity has not been acquired.    Mumps IgG 12/23/2024 <9.0 (L)  Immune >10.9 AU/mL Final                                    Negative         <9.0                                  Equivocal  9.0 - 10.9                                  Positive        >10.9  A positive result generally indicates past exposure to  Mumps virus or previous vaccination.    Hep B S Ab 12/23/2024 Non-Reactive   Final    QuantiFERON Incubation 12/23/2024 Incubation performed.   Final    QUANTIFERON-TB GOLD PLUS 12/23/2024 Negative  Negative Final    No response to M tuberculosis antigens detected.  Infection with M tuberculosis is unlikely, but high risk  individuals should be considered for additional testing  (ATS/IDSA/CDC Clinical Practice Guidelines, 2017). The  reference range is an Antigen minus Nil result of <0.35 IU/mL.  Chemiluminescence immunoassay methodology    QuantiFERON Criteria 12/23/2024 Comment   Final    QuantiFERON-TB Gold Plus is a qualitative indirect test for  M tuberculosis infection (including disease) and is intended for use  in conjunction with risk assessment, radiography, and other medical  and diagnostic evaluations. The QuantiFERON-TB Gold Plus result is  determined by subtracting the Nil value from either TB antigen (Ag)  value. The Mitogen tube serves as a control for the test.    QUANTIFERON TB1 AG VALUE 12/23/2024 0.03  IU/mL Final    QUANTIFERON TB2 AG VALUE 12/23/2024 0.04  IU/mL Final    QuantiFERON Nil Value 12/23/2024 0.01  IU/mL Final    QuantiFERON Mitogen Value 12/23/2024 >10.00  IU/mL Final        Result Review :           Assessment and Plan     Diagnoses and all orders for this visit:    1. Obesity (BMI 30.0-34.9) (Primary)  -     Hemoglobin A1c  -     TSH  -     T4, Free  -     T3  -     Hepatic Function Panel; Future    2. Hypertension, unspecified type  -     Hemoglobin A1c  -     TSH  -     T4, Free  -     T3  -     Hepatic Function Panel; Future    3. Elevated  fasting glucose    4. Acute kidney injury     Patient has his blood pressure medications in his work locker on site. Patient encouraged to go to ER for elevated bp. However patient wants to take his medication this am since he already had labs. He will recheck his blood pressure. If bp 160/100 or greater, pt will go to the ER. Patient also advised to notify his primary of his blood pressure today and see if he can be seen earlier or have cardiac tests done sooner. Patient understands that he is to go to ER if has any new symptoms such as CP, SOB, GARVIN, arm pain, jaw pain or back pain. Will will await patient lab results from today as well as ordered additional labs to have drawn prior to 4 week follow up. Patient will need to be cleared from renal and cardiac standpoint prior to any weight loss medications being prescribed. Follow up in 4 weeks.       ICD-10-CM ICD-9-CM   1. Obesity (BMI 30.0-34.9)  E66.811 278.00   2. Hypertension, unspecified type  I10 401.9   3. Elevated fasting glucose  R73.01 790.21   4. Acute kidney injury  N17.9 584.9                Patient Education:     Patient is a candidate for the program with a BMI of Body mass index is 33.99 kg/m².. Obesity related health conditions include: BMI is above average. BMI management plan is completed. We reviewed portion control, increasing exercise and pharmacologic options. Details regarding the process of the program discussed with patient. Patient voiced understanding. Outpatient labs ordered for baseline  Patient had no further questions or concerns.     I explained that obesity and an elevated BMI can be a disease of body weight dysregulation influenced by factors including environment, genetics and hormones and intiated the discussion of semaglutides in appetite dysregulation and metabolic adaptation. We discussed possible side effects and complications of GLP1s including but not limited to nausea, vomiting, abdominal pain, bloating, constipation,  diarrhea, slowed digestion, heartburn and gallstones or gallbladder disease. Reviewed that side effects are mild to moderate and dose dependent and typically subside in 4-6 weeks. Will await lab results and if appropriate, refer to [facility] pharmacists.    Health Plan to Include:    Eat small portions  Stop eating before full   Avoid fried/greasy foods  Stay well hydrated        Follow Up   Return in about 4 weeks (around 4/3/2025) for Recheck.  Patient was given instructions and counseling regarding his condition or for health maintenance advice. Please see specific information pulled into the AVS if appropriate.         This document has been electronically signed by CHARY Dave  March 6, 2025 12:06 EST

## 2025-03-06 NOTE — PROGRESS NOTES
Chief Complaint  Transitional Care Management    HPI:   HPI   Jose Juan Cabrera is a 49 y.o. male who presents today to [facility] for Transitional Care Management.  History of Present Illness  The patient presents for evaluation of hypertension, chronic kidney disease, left bundle branch block, cough, and suspected sleep apnea.    He was referred to our care by hospitalist due to concerns about the potential exacerbation of his hypertension following a period of dehydration. He was diagnosed with influenza last week, which led to a significant drop in his blood pressure to 65/40 during a hospital visit on Tuesday. He was subsequently admitted for 24 hours and received intravenous fluids. His blood pressure has been elevated since this incident. He reports no chest pain or shortness of breath upon exertion. He is a non-smoker. Dr. Blevins advised him to continue taking carvedilol until further consultation. He has been on antihypertensive medication since 2005 or 2006, prescribed by Dr. Ramirez, which has effectively managed his blood pressure. His diastolic blood pressure typically remains around 90, while his systolic blood pressure fluctuates between 130 and 145.    He has no known renal issues, although he has undergone a 20-hour urine test and renal ultrasound in the past, both of which were normal. He was referred to a nephrologist due to fluctuating creatinine levels, but no renal abnormalities were detected. His potassium levels are typically within the normal range. His creatinine level was found to be elevated during a recent episode of dehydration, but it decreased to 1.3 following rehydration.    He has a history of left bundle branch block, which was discovered during an EKG performed after he fainted in a lobby. He also had low potassium levels at that time.    He reports satisfactory sleep quality, although he occasionally snores. He does not experience constant  "fatigue.    He reports no fever but continues to experience a cough with yellow-green sputum production. He was diagnosed with influenza two weeks ago. He does not experience any shortness of breath.       Previous History:   Past Medical History:   Diagnosis Date    Ankle fracture     Fracture of wrist     Hypertension     Leg fracture       Past Surgical History:   Procedure Laterality Date    COLONOSCOPY N/A 10/12/2023    Procedure: COLONOSCOPY FOR SCREENING;  Surgeon: Tanya Vicente MD;  Location: [facility];  Service: Gastroenterology;  Laterality: N/A;    MANDIBLE SURGERY        Social History     Socioeconomic History    Marital status:    Tobacco Use    Smoking status: Never    Smokeless tobacco: Never   Vaping Use    Vaping status: Never Used   Substance and Sexual Activity    Alcohol use: Yes     Comment: occ    Drug use: No    Sexual activity: Defer      Health Maintenance Due   Topic Date Due    TDAP/TD VACCINES (1 - Tdap) Never done    HEPATITIS C SCREENING  Never done    ANNUAL PHYSICAL  Never done    BMI FOLLOWUP  12/03/2021    COVID-19 Vaccine (1 - 2024-25 season) Never done        Current Medications:  Current Outpatient Medications   Medication Sig Dispense Refill    carvedilol (COREG) 6.25 MG tablet Take 1 tablet by mouth 2 (Two) Times a Day With Meals. 60 tablet 3    chlorthalidone (HYGROTON) 25 MG tablet Take 0.5 tablets by mouth Daily.      lisinopril (PRINIVIL,ZESTRIL) 20 MG tablet Take 1 tablet by mouth Daily.       No current facility-administered medications for this visit.       Allergies:   No Known Allergies    Vitals:   BP (!) 152/112 (BP Location: Right arm, Patient Position: Sitting, Cuff Size: Adult)   Pulse 74   Temp 96.9 °F (36.1 °C) (Temporal)   Ht 185.4 cm (73\")   Wt 118 kg (259 lb 9.6 oz)   SpO2 97%   BMI 34.25 kg/m²     Estimated body mass index is 34.25 kg/m² as calculated from the following:    Height as of this encounter: 185.4 cm (73\").    Weight as " of this encounter: 118 kg (259 lb 9.6 oz).    Jose Juan Cabrera  reports that he has never smoked. He has never used smokeless tobacco.            Physical Exam:   Physical Exam  Constitutional:       Appearance: Normal appearance.   HENT:      Mouth/Throat:      Mouth: Mucous membranes are moist.   Cardiovascular:      Rate and Rhythm: Normal rate and regular rhythm.   Pulmonary:      Effort: Pulmonary effort is normal.      Breath sounds: Normal breath sounds. No wheezing or rhonchi.   Musculoskeletal:         General: Normal range of motion.   Skin:     General: Skin is warm and dry.   Neurological:      Mental Status: He is alert.   Psychiatric:         Mood and Affect: Mood normal.        Lab Results:   Admission on 02/25/2025, Discharged on 02/26/2025   Component Date Value Ref Range Status    Glucose 02/25/2025 96  65 - 99 mg/dL Final    BUN 02/25/2025 29 (H)  6 - 20 mg/dL Final    Creatinine 02/25/2025 1.73 (H)  0.76 - 1.27 mg/dL Final    Sodium 02/25/2025 138  136 - 145 mmol/L Final    Potassium 02/25/2025 3.1 (L)  3.5 - 5.2 mmol/L Final    Chloride 02/25/2025 99  98 - 107 mmol/L Final    CO2 02/25/2025 28.9  22.0 - 29.0 mmol/L Final    Calcium 02/25/2025 8.3 (L)  8.6 - 10.5 mg/dL Final    Total Protein 02/25/2025 6.2  6.0 - 8.5 g/dL Final    Albumin 02/25/2025 3.6  3.5 - 5.2 g/dL Final    ALT (SGPT) 02/25/2025 70 (H)  1 - 41 U/L Final    AST (SGOT) 02/25/2025 56 (H)  1 - 40 U/L Final    Alkaline Phosphatase 02/25/2025 44  39 - 117 U/L Final    Total Bilirubin 02/25/2025 0.7  0.0 - 1.2 mg/dL Final    Globulin 02/25/2025 2.6  gm/dL Final    A/G Ratio 02/25/2025 1.4  g/dL Final    BUN/Creatinine Ratio 02/25/2025 16.8  7.0 - 25.0 Final    Anion Gap 02/25/2025 10.1  5.0 - 15.0 mmol/L Final    eGFR 02/25/2025 47.8 (L)  >60.0 mL/min/1.73 Final    HS Troponin T 02/25/2025 27 (H)  <22 ng/L Final    D-Dimer, Quantitative 02/25/2025 0.27  0.00 - 0.50 MCGFEU/mL Final    QT Interval 02/25/2025 474  ms Final    QTC  Interval 02/25/2025 543  ms Final    WBC 02/25/2025 6.13  3.40 - 10.80 10*3/mm3 Final    RBC 02/25/2025 5.30  4.14 - 5.80 10*6/mm3 Final    Hemoglobin 02/25/2025 16.9  13.0 - 17.7 g/dL Final    Hematocrit 02/25/2025 48.3  37.5 - 51.0 % Final    MCV 02/25/2025 91.1  79.0 - 97.0 fL Final    MCH 02/25/2025 31.9  26.6 - 33.0 pg Final    MCHC 02/25/2025 35.0  31.5 - 35.7 g/dL Final    RDW 02/25/2025 12.3  12.3 - 15.4 % Final    RDW-SD 02/25/2025 40.8  37.0 - 54.0 fl Final    MPV 02/25/2025 10.3  6.0 - 12.0 fL Final    Platelets 02/25/2025 125 (L)  140 - 450 10*3/mm3 Final    Neutrophil % 02/25/2025 49.6  42.7 - 76.0 % Final    Lymphocyte % 02/25/2025 33.4  19.6 - 45.3 % Final    Monocyte % 02/25/2025 16.2 (H)  5.0 - 12.0 % Final    Eosinophil % 02/25/2025 0.3  0.3 - 6.2 % Final    Basophil % 02/25/2025 0.3  0.0 - 1.5 % Final    Immature Grans % 02/25/2025 0.2  0.0 - 0.5 % Final    Neutrophils, Absolute 02/25/2025 3.04  1.70 - 7.00 10*3/mm3 Final    Lymphocytes, Absolute 02/25/2025 2.05  0.70 - 3.10 10*3/mm3 Final    Monocytes, Absolute 02/25/2025 0.99 (H)  0.10 - 0.90 10*3/mm3 Final    Eosinophils, Absolute 02/25/2025 0.02  0.00 - 0.40 10*3/mm3 Final    Basophils, Absolute 02/25/2025 0.02  0.00 - 0.20 10*3/mm3 Final    Immature Grans, Absolute 02/25/2025 0.01  0.00 - 0.05 10*3/mm3 Final    nRBC 02/25/2025 0.0  0.0 - 0.2 /100 WBC Final    HS Troponin T 02/25/2025 22 (H)  <22 ng/L Final    Troponin T Numeric Delta 02/25/2025 -5  ng/L Final    Troponin T % Delta 02/25/2025 -19  Abnormal if >/= 20% Final    Creatine Kinase 02/25/2025 446 (H)  20 - 200 U/L Final    Magnesium 02/25/2025 1.8  1.6 - 2.6 mg/dL Final    Glucose 02/26/2025 102 (H)  65 - 99 mg/dL Final    BUN 02/26/2025 26 (H)  6 - 20 mg/dL Final    Creatinine 02/26/2025 1.52 (H)  0.76 - 1.27 mg/dL Final    Sodium 02/26/2025 135 (L)  136 - 145 mmol/L Final    Potassium 02/26/2025 3.0 (L)  3.5 - 5.2 mmol/L Final    Slight hemolysis detected by analyzer. Result may be  falsely elevated.    Chloride 02/26/2025 98  98 - 107 mmol/L Final    CO2 02/26/2025 27.8  22.0 - 29.0 mmol/L Final    Calcium 02/26/2025 7.7 (L)  8.6 - 10.5 mg/dL Final    BUN/Creatinine Ratio 02/26/2025 17.1  7.0 - 25.0 Final    Anion Gap 02/26/2025 9.2  5.0 - 15.0 mmol/L Final    eGFR 02/26/2025 55.8 (L)  >60.0 mL/min/1.73 Final    WBC 02/26/2025 6.00  3.40 - 10.80 10*3/mm3 Final    RBC 02/26/2025 4.97  4.14 - 5.80 10*6/mm3 Final    Hemoglobin 02/26/2025 15.9  13.0 - 17.7 g/dL Final    Hematocrit 02/26/2025 46.3  37.5 - 51.0 % Final    MCV 02/26/2025 93.2  79.0 - 97.0 fL Final    MCH 02/26/2025 32.0  26.6 - 33.0 pg Final    MCHC 02/26/2025 34.3  31.5 - 35.7 g/dL Final    RDW 02/26/2025 12.3  12.3 - 15.4 % Final    RDW-SD 02/26/2025 42.4  37.0 - 54.0 fl Final    MPV 02/26/2025 10.5  6.0 - 12.0 fL Final    Platelets 02/26/2025 122 (L)  140 - 450 10*3/mm3 Final    Magnesium 02/26/2025 1.9  1.6 - 2.6 mg/dL Final    COVID19 02/25/2025 Not Detected  Not Detected - Ref. Range Final    Influenza A PCR 02/25/2025 Detected (A)  Not Detected Final    Influenza B PCR 02/25/2025 Not Detected  Not Detected Final    Potassium 02/26/2025 3.6  3.5 - 5.2 mmol/L Final    Slight hemolysis detected by analyzer. Result may be falsely elevated.   Infusion on 02/25/2025   Component Date Value Ref Range Status    Glucose 02/25/2025 113  70 - 130 mg/dL Final   Transcribe Orders on 02/24/2025   Component Date Value Ref Range Status    Glucose 02/24/2025 121 (H)  65 - 99 mg/dL Final    BUN 02/24/2025 18  6 - 20 mg/dL Final    Creatinine 02/24/2025 1.72 (H)  0.76 - 1.27 mg/dL Final    Sodium 02/24/2025 135 (L)  136 - 145 mmol/L Final    Potassium 02/24/2025 3.6  3.5 - 5.2 mmol/L Final    Slight hemolysis detected by analyzer. Result may be falsely elevated.    Chloride 02/24/2025 93 (L)  98 - 107 mmol/L Final    CO2 02/24/2025 30.1 (H)  22.0 - 29.0 mmol/L Final    Calcium 02/24/2025 9.2  8.6 - 10.5 mg/dL Final    Total Protein 02/24/2025 7.4   6.0 - 8.5 g/dL Final    Albumin 02/24/2025 4.5  3.5 - 5.2 g/dL Final    ALT (SGPT) 02/24/2025 78 (H)  1 - 41 U/L Final    AST (SGOT) 02/24/2025 51 (H)  1 - 40 U/L Final    Alkaline Phosphatase 02/24/2025 56  39 - 117 U/L Final    Total Bilirubin 02/24/2025 1.1  0.0 - 1.2 mg/dL Final    Globulin 02/24/2025 2.9  gm/dL Final    A/G Ratio 02/24/2025 1.6  g/dL Final    BUN/Creatinine Ratio 02/24/2025 10.5  7.0 - 25.0 Final    Anion Gap 02/24/2025 11.9  5.0 - 15.0 mmol/L Final    eGFR 02/24/2025 48.1 (L)  >60.0 mL/min/1.73 Final   Lab Requisition on 12/23/2024   Component Date Value Ref Range Status    Rubella Antibodies, IgG 12/23/2024 10.00  Immune >0.99 index Final                                    Non-immune       <0.90                                  Equivocal  0.90 - 0.99                                  Immune           >0.99    Rubeola IgG 12/23/2024 <13.5 (L)  Immune >16.4 AU/mL Final                                     Negative        <13.5                                   Equivocal 13.5 - 16.4                                   Positive        >16.4  Presence of antibodies to Rubeola is presumptive evidence  of immunity except when acute infection is suspected.    Varicella IgG 12/23/2024 Reactive  Non Reactive Final    **Please note reference interval change**  A Reactive result is considered evidence of immunity to VZV.  Reactive indicates that VZV IgG was detected consistent with  previous infection and/or vaccination.  A Non Reactive result indicates that VZV IgG was not detected  suggesting that immunity has not been acquired.    Mumps IgG 12/23/2024 <9.0 (L)  Immune >10.9 AU/mL Final                                    Negative         <9.0                                  Equivocal  9.0 - 10.9                                  Positive        >10.9  A positive result generally indicates past exposure to  Mumps virus or previous vaccination.    Hep B S Ab 12/23/2024 Non-Reactive   Final    QuantiFERON  Incubation 12/23/2024 Incubation performed.   Final    QUANTIFERON-TB GOLD PLUS 12/23/2024 Negative  Negative Final    No response to M tuberculosis antigens detected.  Infection with M tuberculosis is unlikely, but high risk  individuals should be considered for additional testing  (ATS/IDSA/CDC Clinical Practice Guidelines, 2017). The  reference range is an Antigen minus Nil result of <0.35 IU/mL.  Chemiluminescence immunoassay methodology    QuantiFERON Criteria 12/23/2024 Comment   Final    QuantiFERON-TB Gold Plus is a qualitative indirect test for  M tuberculosis infection (including disease) and is intended for use  in conjunction with risk assessment, radiography, and other medical  and diagnostic evaluations. The QuantiFERON-TB Gold Plus result is  determined by subtracting the Nil value from either TB antigen (Ag)  value. The Mitogen tube serves as a control for the test.    QUANTIFERON TB1 AG VALUE 12/23/2024 0.03  IU/mL Final    QUANTIFERON TB2 AG VALUE 12/23/2024 0.04  IU/mL Final    QuantiFERON Nil Value 12/23/2024 0.01  IU/mL Final    QuantiFERON Mitogen Value 12/23/2024 >10.00  IU/mL Final       Assessment and Plan  Diagnoses and all orders for this visit:    1. Hypersomnia (Primary)  -     Home Sleep Study; Future    2. Elevated serum creatinine  -     CBC No Differential; Future  -     Renal Function Panel; Future  -     US Renal Bilateral; Future  -     Protein / Creatinine Ratio, Urine - Urine, Clean Catch; Future    3. Hyperglycemia    4. Screening for cardiovascular condition  -     Lipid panel; Future    5. Acute cough  -     XR Chest 2 View      Assessment & Plan  1. Hypertension.  His elevated blood pressure readings, coupled with a history of left bundle branch block and potential sleep apnea, suggest a longstanding history of hypertension. The observed kidney damage may be a consequence of this chronic condition. He will be advised to resume his chlorthalidone regimen, taking half a tablet  daily. Lisinopril will be temporarily discontinued. A home sleep study will be conducted to rule out sleep apnea as a contributing factor to his hypertension. If his creatinine levels return to normal, he will be instructed to restart his lisinopril.    2. Chronic Kidney Disease.  The suspicion of chronic kidney disease is based on his elevated creatinine levels, which have not shown drastic improvement with fluid therapy. A comprehensive renal workup, including a renal ultrasound, will be conducted to identify the underlying cause of the observed kidney damage. Will check pr/cr ratio.     3. Left Bundle Branch Block.  He has a history of left bundle branch block, which was discovered during an EKG performed after he fainted in a lobby. He also had low potassium levels at that time.    4. Cough.  He reports a persistent cough with yellow-green sputum production, which could be indicative of post-viral bronchitis. A chest x-ray will be ordered to further investigate the cause of his cough.    5. Suspected Sleep Apnea.  Given his elevated blood pressure and history of snoring, sleep apnea is suspected. A home sleep study will be conducted to confirm the diagnosis. If sleep apnea is identified as a contributing factor to his hypertension, appropriate treatment will be initiated. Stop-Bang=5, [place] sleepiness scale 16.     Follow-up  The patient will follow up in 3 weeks.       BMI is >= 30 and <35. (Class 1 Obesity). The following options were offered after discussion;: weight loss educational material (shared in after visit summary)       New Medications:   No orders of the defined types were placed in this encounter.      Discontinued Medications:   Medications Discontinued During This Encounter   Medication Reason    potassium chloride (KLOR-CON M20) 20 MEQ CR tablet *Therapy completed                 Follow Up:   Return in about 3 weeks (around 3/27/2025), or Needs labs.    Patient was given instructions and  counseling regarding his condition or for health maintenance advice. Please see specific information pulled into the AVS if appropriate.     Patient or patient representative verbalized consent for the use of Ambient Listening during the visit with  Ruddy Woodson DO for chart documentation. 3/6/2025  11:08 EST       This document has been electronically signed by Ruddy Woodson DO   March 6, 2025 10:38 EST      Dictated Utilizing Dragon Dictation: Part of this note may be an electronic transcription/translation of spoken language to printed text using the Dragon Dictation System.

## 2025-03-06 NOTE — TELEPHONE ENCOUNTER
Called patient. He did take his Lisinopril approximately one hour ago. His PCP instructed him to restart this antihypertensive only until labs resulted. I did check at no labs have resulted yet. Pt will recheck his BP.

## 2025-03-07 ENCOUNTER — PATIENT ROUNDING (BHMG ONLY) (OUTPATIENT)
Dept: FAMILY MEDICINE CLINIC | Facility: CLINIC | Age: 50
End: 2025-03-07
Payer: COMMERCIAL

## 2025-03-07 NOTE — PROGRESS NOTES
March 7, 2025    Hello, may I speak with Jose Juan Chicasle?    My name is Jodie      I am  with [facility] [place]  [facility]  [address] [place] [address] [phone].    Before we get started may I verify your date of birth? 1975 YES     I am calling to officially welcome you to our practice and ask about your recent visit. Is this a good time to talk? YES     Tell me about your visit with us. What things went well?  was happy with the visit ,no concern       We're always looking for ways to make our patients' experiences even better. Do you have recommendations on ways we may improve?  NO     Overall were you satisfied with your first visit to our practice? YES        I appreciate you taking the time to speak with me today. Is there anything else I can do for you?   NO     Thank you, and have a great day.

## 2025-03-13 ENCOUNTER — RESULTS FOLLOW-UP (OUTPATIENT)
Dept: FAMILY MEDICINE CLINIC | Facility: CLINIC | Age: 50
End: 2025-03-13
Payer: COMMERCIAL

## 2025-03-19 ENCOUNTER — HOSPITAL ENCOUNTER (OUTPATIENT)
Dept: ULTRASOUND IMAGING | Facility: HOSPITAL | Age: 50
Discharge: HOME | End: 2025-03-19
Admitting: STUDENT IN AN ORGANIZED HEALTH CARE EDUCATION/TRAINING PROGRAM
Payer: COMMERCIAL

## 2025-03-19 DIAGNOSIS — R79.89 ELEVATED SERUM CREATININE: ICD-10-CM

## 2025-03-19 PROCEDURE — 76775 US EXAM ABDO BACK WALL LIM: CPT

## 2025-03-26 ENCOUNTER — RESULTS FOLLOW-UP (OUTPATIENT)
Dept: ULTRASOUND IMAGING | Facility: HOSPITAL | Age: 50
End: 2025-03-26
Payer: COMMERCIAL

## 2025-04-02 ENCOUNTER — LAB (OUTPATIENT)
Dept: FAMILY MEDICINE CLINIC | Facility: CLINIC | Age: 50
End: 2025-04-02
Payer: COMMERCIAL

## 2025-04-02 ENCOUNTER — OFFICE VISIT (OUTPATIENT)
Dept: FAMILY MEDICINE CLINIC | Facility: CLINIC | Age: 50
End: 2025-04-02
Payer: COMMERCIAL

## 2025-04-02 ENCOUNTER — LAB (OUTPATIENT)
Dept: LAB | Facility: HOSPITAL | Age: 50
End: 2025-04-02
Payer: COMMERCIAL

## 2025-04-02 VITALS
TEMPERATURE: 97.1 F | WEIGHT: 261 LBS | SYSTOLIC BLOOD PRESSURE: 168 MMHG | BODY MASS INDEX: 34.59 KG/M2 | HEIGHT: 73 IN | OXYGEN SATURATION: 98 % | DIASTOLIC BLOOD PRESSURE: 122 MMHG | HEART RATE: 82 BPM

## 2025-04-02 DIAGNOSIS — I10 HYPERTENSION, UNSPECIFIED TYPE: ICD-10-CM

## 2025-04-02 DIAGNOSIS — E66.811 OBESITY (BMI 30.0-34.9): ICD-10-CM

## 2025-04-02 DIAGNOSIS — I1A.0 RESISTANT HYPERTENSION: ICD-10-CM

## 2025-04-02 DIAGNOSIS — I1A.0 RESISTANT HYPERTENSION: Primary | ICD-10-CM

## 2025-04-02 DIAGNOSIS — N28.1 RENAL CYST: ICD-10-CM

## 2025-04-02 DIAGNOSIS — R79.89 ELEVATED LFTS: ICD-10-CM

## 2025-04-02 PROCEDURE — 84244 ASSAY OF RENIN: CPT

## 2025-04-02 PROCEDURE — 80069 RENAL FUNCTION PANEL: CPT

## 2025-04-02 PROCEDURE — 99213 OFFICE O/P EST LOW 20 MIN: CPT | Performed by: STUDENT IN AN ORGANIZED HEALTH CARE EDUCATION/TRAINING PROGRAM

## 2025-04-02 PROCEDURE — 36415 COLL VENOUS BLD VENIPUNCTURE: CPT

## 2025-04-02 PROCEDURE — 82533 TOTAL CORTISOL: CPT

## 2025-04-02 PROCEDURE — 83835 ASSAY OF METANEPHRINES: CPT

## 2025-04-02 PROCEDURE — 82088 ASSAY OF ALDOSTERONE: CPT

## 2025-04-02 RX ORDER — SPIRONOLACTONE 50 MG/1
50 TABLET, FILM COATED ORAL DAILY
Qty: 30 TABLET | Refills: 2 | Status: SHIPPED | OUTPATIENT
Start: 2025-04-02

## 2025-04-03 ENCOUNTER — RESULTS FOLLOW-UP (OUTPATIENT)
Dept: FAMILY MEDICINE CLINIC | Facility: CLINIC | Age: 50
End: 2025-04-03
Payer: COMMERCIAL

## 2025-04-03 LAB
ALBUMIN SERPL-MCNC: 4.3 G/DL (ref 3.5–5.2)
ANION GAP SERPL CALCULATED.3IONS-SCNC: 9.1 MMOL/L (ref 5–15)
BUN SERPL-MCNC: 21 MG/DL (ref 6–20)
BUN/CREAT SERPL: 16.9 (ref 7–25)
CALCIUM SPEC-SCNC: 9 MG/DL (ref 8.6–10.5)
CHLORIDE SERPL-SCNC: 104 MMOL/L (ref 98–107)
CO2 SERPL-SCNC: 24.9 MMOL/L (ref 22–29)
CORTIS SERPL-MCNC: 5.11 MCG/DL
CREAT SERPL-MCNC: 1.24 MG/DL (ref 0.76–1.27)
EGFRCR SERPLBLD CKD-EPI 2021: 71.3 ML/MIN/1.73
GLUCOSE SERPL-MCNC: 79 MG/DL (ref 65–99)
PHOSPHATE SERPL-MCNC: 3.4 MG/DL (ref 2.5–4.5)
POTASSIUM SERPL-SCNC: 3.8 MMOL/L (ref 3.5–5.2)
SODIUM SERPL-SCNC: 138 MMOL/L (ref 136–145)

## 2025-04-04 NOTE — PROGRESS NOTES
Chief Complaint  Primary hypertension    HPI:   HPI   Jose Juan Cabrera is a 49 y.o. male who presents today to [facility] for Primary hypertension.  History of Present Illness  The patient presents for evaluation of elevated blood pressure.    He reports that his blood pressure typically averages around 140/90, with occasional spikes to 145. He has been adhering to his prescribed medication regimen and is uncertain about the cause of his current elevated blood pressure. He notes that his blood pressure tends to normalize during the day. He has previously experienced influenza without any significant dehydration.    He has not yet undergone the sleep test and has not received any communication regarding it. He does not experience constant fatigue and generally feels well upon waking. His wife has observed mild snoring after approximately 16 hours of sleep.       Previous History:   Past Medical History:   Diagnosis Date    Ankle fracture     Fracture of wrist     Hypertension     Leg fracture       Past Surgical History:   Procedure Laterality Date    COLONOSCOPY N/A 10/12/2023    Procedure: COLONOSCOPY FOR SCREENING;  Surgeon: Tanya Vicente MD;  Location: [facility];  Service: Gastroenterology;  Laterality: N/A;    MANDIBLE SURGERY        Social History     Socioeconomic History    Marital status:    Tobacco Use    Smoking status: Never    Smokeless tobacco: Never   Vaping Use    Vaping status: Never Used   Substance and Sexual Activity    Alcohol use: Yes     Comment: occ    Drug use: No    Sexual activity: Defer      Health Maintenance Due   Topic Date Due    TDAP/TD VACCINES (1 - Tdap) Never done    HEPATITIS C SCREENING  Never done    ANNUAL PHYSICAL  Never done    COVID-19 Vaccine (1 - 2024-25 season) Never done        Current Medications:  Current Outpatient Medications   Medication Sig Dispense Refill    carvedilol (COREG) 6.25 MG tablet Take 1 tablet by  "mouth 2 (Two) Times a Day With Meals. 60 tablet 3    chlorthalidone (HYGROTON) 25 MG tablet Take 0.5 tablets by mouth Daily.      lisinopril (PRINIVIL,ZESTRIL) 20 MG tablet Take 1 tablet by mouth Daily.      spironolactone (Aldactone) 50 MG tablet Take 1 tablet by mouth Daily. 30 tablet 2     No current facility-administered medications for this visit.       Allergies:   No Known Allergies    Vitals:   BP (!) 168/122 (BP Location: Right arm, Patient Position: Sitting, Cuff Size: Adult)   Pulse 82   Temp 97.1 °F (36.2 °C) (Temporal)   Ht 185.4 cm (73\")   Wt 118 kg (261 lb)   SpO2 98%   BMI 34.43 kg/m²     Estimated body mass index is 34.43 kg/m² as calculated from the following:    Height as of this encounter: 185.4 cm (73\").    Weight as of this encounter: 118 kg (261 lb).    Jose Juan Cabrera  reports that he has never smoked. He has never used smokeless tobacco.            Physical Exam:   Physical Exam  Constitutional:       Appearance: Normal appearance.   HENT:      Mouth/Throat:      Mouth: Mucous membranes are moist.   Cardiovascular:      Rate and Rhythm: Normal rate and regular rhythm.   Pulmonary:      Effort: Pulmonary effort is normal.      Breath sounds: Normal breath sounds. No wheezing or rhonchi.   Musculoskeletal:         General: Normal range of motion.   Skin:     General: Skin is warm and dry.   Neurological:      Mental Status: He is alert.   Psychiatric:         Mood and Affect: Mood normal.          Lab Results:   Lab on 04/02/2025   Component Date Value Ref Range Status    Cortisol 04/02/2025 5.11    mcg/dL Final    Glucose 04/02/2025 79  65 - 99 mg/dL Final    BUN 04/02/2025 21 (H)  6 - 20 mg/dL Final    Creatinine 04/02/2025 1.24  0.76 - 1.27 mg/dL Final    Sodium 04/02/2025 138  136 - 145 mmol/L Final    Potassium 04/02/2025 3.8  3.5 - 5.2 mmol/L Final    Chloride 04/02/2025 104  98 - 107 mmol/L Final    CO2 04/02/2025 24.9  22.0 - 29.0 mmol/L Final    Calcium 04/02/2025 9.0  8.6 - " 10.5 mg/dL Final    Albumin 04/02/2025 4.3  3.5 - 5.2 g/dL Final    Phosphorus 04/02/2025 3.4  2.5 - 4.5 mg/dL Final    Anion Gap 04/02/2025 9.1  5.0 - 15.0 mmol/L Final    BUN/Creatinine Ratio 04/02/2025 16.9  7.0 - 25.0 Final    eGFR 04/02/2025 71.3  >60.0 mL/min/1.73 Final   Lab on 03/06/2025   Component Date Value Ref Range Status    WBC 03/06/2025 5.15  3.40 - 10.80 10*3/mm3 Final    RBC 03/06/2025 5.18  4.14 - 5.80 10*6/mm3 Final    Hemoglobin 03/06/2025 16.9  13.0 - 17.7 g/dL Final    Hematocrit 03/06/2025 48.8  37.5 - 51.0 % Final    MCV 03/06/2025 94.2  79.0 - 97.0 fL Final    MCH 03/06/2025 32.6  26.6 - 33.0 pg Final    MCHC 03/06/2025 34.6  31.5 - 35.7 g/dL Final    RDW 03/06/2025 12.5  12.3 - 15.4 % Final    RDW-SD 03/06/2025 43.6  37.0 - 54.0 fl Final    MPV 03/06/2025 11.4  6.0 - 12.0 fL Final    Platelets 03/06/2025 263  140 - 450 10*3/mm3 Final    Glucose 03/06/2025 90  65 - 99 mg/dL Final    BUN 03/06/2025 21 (H)  6 - 20 mg/dL Final    Creatinine 03/06/2025 1.38 (H)  0.76 - 1.27 mg/dL Final    Sodium 03/06/2025 139  136 - 145 mmol/L Final    Potassium 03/06/2025 4.9  3.5 - 5.2 mmol/L Final    Chloride 03/06/2025 104  98 - 107 mmol/L Final    CO2 03/06/2025 27.0  22.0 - 29.0 mmol/L Final    Calcium 03/06/2025 9.3  8.6 - 10.5 mg/dL Final    Albumin 03/06/2025 4.3  3.5 - 5.2 g/dL Final    Phosphorus 03/06/2025 3.5  2.5 - 4.5 mg/dL Final    Anion Gap 03/06/2025 8.0  5.0 - 15.0 mmol/L Final    BUN/Creatinine Ratio 03/06/2025 15.2  7.0 - 25.0 Final    eGFR 03/06/2025 62.7  >60.0 mL/min/1.73 Final    Protein/Creatinine Ratio, Urine 03/06/2025 53.0  0.0 - 200.0 mg/G Crea Final    Creatinine, Urine 03/06/2025 158.5  mg/dL Final    Total Protein, Urine 03/06/2025 8.4  mg/dL Final    Total Cholesterol 03/06/2025 124  0 - 200 mg/dL Final    Triglycerides 03/06/2025 83  0 - 150 mg/dL Final    HDL Cholesterol 03/06/2025 33 (L)  40 - 60 mg/dL Final    LDL Cholesterol  03/06/2025 75  0 - 100 mg/dL Final    VLDL  Cholesterol 03/06/2025 16  5 - 40 mg/dL Final    LDL/HDL Ratio 03/06/2025 2.25   Final   Admission on 02/25/2025, Discharged on 02/26/2025   Component Date Value Ref Range Status    Glucose 02/25/2025 96  65 - 99 mg/dL Final    BUN 02/25/2025 29 (H)  6 - 20 mg/dL Final    Creatinine 02/25/2025 1.73 (H)  0.76 - 1.27 mg/dL Final    Sodium 02/25/2025 138  136 - 145 mmol/L Final    Potassium 02/25/2025 3.1 (L)  3.5 - 5.2 mmol/L Final    Chloride 02/25/2025 99  98 - 107 mmol/L Final    CO2 02/25/2025 28.9  22.0 - 29.0 mmol/L Final    Calcium 02/25/2025 8.3 (L)  8.6 - 10.5 mg/dL Final    Total Protein 02/25/2025 6.2  6.0 - 8.5 g/dL Final    Albumin 02/25/2025 3.6  3.5 - 5.2 g/dL Final    ALT (SGPT) 02/25/2025 70 (H)  1 - 41 U/L Final    AST (SGOT) 02/25/2025 56 (H)  1 - 40 U/L Final    Alkaline Phosphatase 02/25/2025 44  39 - 117 U/L Final    Total Bilirubin 02/25/2025 0.7  0.0 - 1.2 mg/dL Final    Globulin 02/25/2025 2.6  gm/dL Final    A/G Ratio 02/25/2025 1.4  g/dL Final    BUN/Creatinine Ratio 02/25/2025 16.8  7.0 - 25.0 Final    Anion Gap 02/25/2025 10.1  5.0 - 15.0 mmol/L Final    eGFR 02/25/2025 47.8 (L)  >60.0 mL/min/1.73 Final    HS Troponin T 02/25/2025 27 (H)  <22 ng/L Final    D-Dimer, Quantitative 02/25/2025 0.27  0.00 - 0.50 MCGFEU/mL Final    QT Interval 02/25/2025 474  ms Final    QTC Interval 02/25/2025 543  ms Final    WBC 02/25/2025 6.13  3.40 - 10.80 10*3/mm3 Final    RBC 02/25/2025 5.30  4.14 - 5.80 10*6/mm3 Final    Hemoglobin 02/25/2025 16.9  13.0 - 17.7 g/dL Final    Hematocrit 02/25/2025 48.3  37.5 - 51.0 % Final    MCV 02/25/2025 91.1  79.0 - 97.0 fL Final    MCH 02/25/2025 31.9  26.6 - 33.0 pg Final    MCHC 02/25/2025 35.0  31.5 - 35.7 g/dL Final    RDW 02/25/2025 12.3  12.3 - 15.4 % Final    RDW-SD 02/25/2025 40.8  37.0 - 54.0 fl Final    MPV 02/25/2025 10.3  6.0 - 12.0 fL Final    Platelets 02/25/2025 125 (L)  140 - 450 10*3/mm3 Final    Neutrophil % 02/25/2025 49.6  42.7 - 76.0 % Final     Lymphocyte % 02/25/2025 33.4  19.6 - 45.3 % Final    Monocyte % 02/25/2025 16.2 (H)  5.0 - 12.0 % Final    Eosinophil % 02/25/2025 0.3  0.3 - 6.2 % Final    Basophil % 02/25/2025 0.3  0.0 - 1.5 % Final    Immature Grans % 02/25/2025 0.2  0.0 - 0.5 % Final    Neutrophils, Absolute 02/25/2025 3.04  1.70 - 7.00 10*3/mm3 Final    Lymphocytes, Absolute 02/25/2025 2.05  0.70 - 3.10 10*3/mm3 Final    Monocytes, Absolute 02/25/2025 0.99 (H)  0.10 - 0.90 10*3/mm3 Final    Eosinophils, Absolute 02/25/2025 0.02  0.00 - 0.40 10*3/mm3 Final    Basophils, Absolute 02/25/2025 0.02  0.00 - 0.20 10*3/mm3 Final    Immature Grans, Absolute 02/25/2025 0.01  0.00 - 0.05 10*3/mm3 Final    nRBC 02/25/2025 0.0  0.0 - 0.2 /100 WBC Final    HS Troponin T 02/25/2025 22 (H)  <22 ng/L Final    Troponin T Numeric Delta 02/25/2025 -5  ng/L Final    Troponin T % Delta 02/25/2025 -19  Abnormal if >/= 20% Final    Creatine Kinase 02/25/2025 446 (H)  20 - 200 U/L Final    Magnesium 02/25/2025 1.8  1.6 - 2.6 mg/dL Final    Glucose 02/26/2025 102 (H)  65 - 99 mg/dL Final    BUN 02/26/2025 26 (H)  6 - 20 mg/dL Final    Creatinine 02/26/2025 1.52 (H)  0.76 - 1.27 mg/dL Final    Sodium 02/26/2025 135 (L)  136 - 145 mmol/L Final    Potassium 02/26/2025 3.0 (L)  3.5 - 5.2 mmol/L Final    Slight hemolysis detected by analyzer. Result may be falsely elevated.    Chloride 02/26/2025 98  98 - 107 mmol/L Final    CO2 02/26/2025 27.8  22.0 - 29.0 mmol/L Final    Calcium 02/26/2025 7.7 (L)  8.6 - 10.5 mg/dL Final    BUN/Creatinine Ratio 02/26/2025 17.1  7.0 - 25.0 Final    Anion Gap 02/26/2025 9.2  5.0 - 15.0 mmol/L Final    eGFR 02/26/2025 55.8 (L)  >60.0 mL/min/1.73 Final    WBC 02/26/2025 6.00  3.40 - 10.80 10*3/mm3 Final    RBC 02/26/2025 4.97  4.14 - 5.80 10*6/mm3 Final    Hemoglobin 02/26/2025 15.9  13.0 - 17.7 g/dL Final    Hematocrit 02/26/2025 46.3  37.5 - 51.0 % Final    MCV 02/26/2025 93.2  79.0 - 97.0 fL Final    MCH 02/26/2025 32.0  26.6 - 33.0 pg  Final    MCHC 02/26/2025 34.3  31.5 - 35.7 g/dL Final    RDW 02/26/2025 12.3  12.3 - 15.4 % Final    RDW-SD 02/26/2025 42.4  37.0 - 54.0 fl Final    MPV 02/26/2025 10.5  6.0 - 12.0 fL Final    Platelets 02/26/2025 122 (L)  140 - 450 10*3/mm3 Final    Magnesium 02/26/2025 1.9  1.6 - 2.6 mg/dL Final    COVID19 02/25/2025 Not Detected  Not Detected - Ref. Range Final    Influenza A PCR 02/25/2025 Detected (A)  Not Detected Final    Influenza B PCR 02/25/2025 Not Detected  Not Detected Final    Potassium 02/26/2025 3.6  3.5 - 5.2 mmol/L Final    Slight hemolysis detected by analyzer. Result may be falsely elevated.   Infusion on 02/25/2025   Component Date Value Ref Range Status    Glucose 02/25/2025 113  70 - 130 mg/dL Final   Transcribe Orders on 02/24/2025   Component Date Value Ref Range Status    Glucose 02/24/2025 121 (H)  65 - 99 mg/dL Final    BUN 02/24/2025 18  6 - 20 mg/dL Final    Creatinine 02/24/2025 1.72 (H)  0.76 - 1.27 mg/dL Final    Sodium 02/24/2025 135 (L)  136 - 145 mmol/L Final    Potassium 02/24/2025 3.6  3.5 - 5.2 mmol/L Final    Slight hemolysis detected by analyzer. Result may be falsely elevated.    Chloride 02/24/2025 93 (L)  98 - 107 mmol/L Final    CO2 02/24/2025 30.1 (H)  22.0 - 29.0 mmol/L Final    Calcium 02/24/2025 9.2  8.6 - 10.5 mg/dL Final    Total Protein 02/24/2025 7.4  6.0 - 8.5 g/dL Final    Albumin 02/24/2025 4.5  3.5 - 5.2 g/dL Final    ALT (SGPT) 02/24/2025 78 (H)  1 - 41 U/L Final    AST (SGOT) 02/24/2025 51 (H)  1 - 40 U/L Final    Alkaline Phosphatase 02/24/2025 56  39 - 117 U/L Final    Total Bilirubin 02/24/2025 1.1  0.0 - 1.2 mg/dL Final    Globulin 02/24/2025 2.9  gm/dL Final    A/G Ratio 02/24/2025 1.6  g/dL Final    BUN/Creatinine Ratio 02/24/2025 10.5  7.0 - 25.0 Final    Anion Gap 02/24/2025 11.9  5.0 - 15.0 mmol/L Final    eGFR 02/24/2025 48.1 (L)  >60.0 mL/min/1.73 Final   Lab Requisition on 12/23/2024   Component Date Value Ref Range Status    Rubella Antibodies,  IgG 12/23/2024 10.00  Immune >0.99 index Final                                    Non-immune       <0.90                                  Equivocal  0.90 - 0.99                                  Immune           >0.99    Rubeola IgG 12/23/2024 <13.5 (L)  Immune >16.4 AU/mL Final                                     Negative        <13.5                                   Equivocal 13.5 - 16.4                                   Positive        >16.4  Presence of antibodies to Rubeola is presumptive evidence  of immunity except when acute infection is suspected.    Varicella IgG 12/23/2024 Reactive  Non Reactive Final    **Please note reference interval change**  A Reactive result is considered evidence of immunity to VZV.  Reactive indicates that VZV IgG was detected consistent with  previous infection and/or vaccination.  A Non Reactive result indicates that VZV IgG was not detected  suggesting that immunity has not been acquired.    Mumps IgG 12/23/2024 <9.0 (L)  Immune >10.9 AU/mL Final                                    Negative         <9.0                                  Equivocal  9.0 - 10.9                                  Positive        >10.9  A positive result generally indicates past exposure to  Mumps virus or previous vaccination.    Hep B S Ab 12/23/2024 Non-Reactive   Final    QuantiFERON Incubation 12/23/2024 Incubation performed.   Final    QUANTIFERON-TB GOLD PLUS 12/23/2024 Negative  Negative Final    No response to M tuberculosis antigens detected.  Infection with M tuberculosis is unlikely, but high risk  individuals should be considered for additional testing  (ATS/IDSA/CDC Clinical Practice Guidelines, 2017). The  reference range is an Antigen minus Nil result of <0.35 IU/mL.  Chemiluminescence immunoassay methodology    QuantiFERON Criteria 12/23/2024 Comment   Final    QuantiFERON-TB Gold Plus is a qualitative indirect test for  M tuberculosis infection (including disease) and is intended for  use  in conjunction with risk assessment, radiography, and other medical  and diagnostic evaluations. The QuantiFERON-TB Gold Plus result is  determined by subtracting the Nil value from either TB antigen (Ag)  value. The Mitogen tube serves as a control for the test.    QUANTIFERON TB1 AG VALUE 12/23/2024 0.03  IU/mL Final    QUANTIFERON TB2 AG VALUE 12/23/2024 0.04  IU/mL Final    QuantiFERON Nil Value 12/23/2024 0.01  IU/mL Final    QuantiFERON Mitogen Value 12/23/2024 >10.00  IU/mL Final       Assessment and Plan  Diagnoses and all orders for this visit:    1. Resistant hypertension (Primary)  -     spironolactone (Aldactone) 50 MG tablet; Take 1 tablet by mouth Daily.  Dispense: 30 tablet; Refill: 2  -     Aldosterone / Renin Ratio; Future  -     Cortisol; Future  -     Renal Function Panel; Future  -     Metanephrines, Frac. Free, Plasma; Future    2. Renal cyst      Assessment & Plan  1. Hypertension  His blood pressure remains significantly elevated despite adherence to his current medication regimen. The etiology of this hypertension could potentially be attributed to sleep apnea or hormone-secreting tumors, although these are less common causes. He will continue with his existing medications, and an additional antihypertensive agent, spironolactone 50 mg, will be introduced. Further laboratory investigations will be conducted to elucidate the underlying cause of his hypertension. A message will be sent to follow up on his sleep study referral.    2. Renal cyst.  A small, simple cyst was noted on the renal ultrasound. This cyst has been present since birth and is not a cause for concern. No monitoring is required for this cyst.    Follow-up  The patient will follow up in 2 weeks.               New Medications:   New Medications Ordered This Visit   Medications    spironolactone (Aldactone) 50 MG tablet     Sig: Take 1 tablet by mouth Daily.     Dispense:  30 tablet     Refill:  2       Discontinued  Medications:   There are no discontinued medications.              Follow Up:   Return in about 2 weeks (around 4/16/2025), or Needs labs.    Patient was given instructions and counseling regarding his condition or for health maintenance advice. Please see specific information pulled into the AVS if appropriate.     Patient or patient representative verbalized consent for the use of Ambient Listening during the visit with  Ruddy Woodson DO for chart documentation. 4/4/2025  09:34 EDT       This document has been electronically signed by Ruddy Woodson DO   April 4, 2025 09:34 EDT      Dictated Utilizing Dragon Dictation: Part of this note may be an electronic transcription/translation of spoken language to printed text using the Dragon Dictation System.

## 2025-04-08 LAB
ALDOST SERPL-MCNC: 4.1 NG/DL (ref 0–30)
ALDOST/RENIN PLAS-RTO: 1.2 {RATIO} (ref 0–30)
RENIN PLAS-CCNC: 3.46 NG/ML/HR (ref 0.17–5.38)

## 2025-04-09 LAB
METANEPH FREE SERPL-MCNC: 30.5 PG/ML (ref 0–88)
NORMETANEPHRINE SERPL-MCNC: 41 PG/ML (ref 0–218.9)

## 2025-04-15 ENCOUNTER — OFFICE VISIT (OUTPATIENT)
Dept: FAMILY MEDICINE CLINIC | Facility: CLINIC | Age: 50
End: 2025-04-15
Payer: COMMERCIAL

## 2025-04-15 ENCOUNTER — LAB (OUTPATIENT)
Dept: FAMILY MEDICINE CLINIC | Facility: CLINIC | Age: 50
End: 2025-04-15
Payer: COMMERCIAL

## 2025-04-15 VITALS
SYSTOLIC BLOOD PRESSURE: 132 MMHG | HEIGHT: 73 IN | WEIGHT: 254.4 LBS | HEART RATE: 100 BPM | DIASTOLIC BLOOD PRESSURE: 84 MMHG | TEMPERATURE: 96.8 F | BODY MASS INDEX: 33.72 KG/M2 | OXYGEN SATURATION: 96 %

## 2025-04-15 DIAGNOSIS — I10 HYPERTENSION, UNSPECIFIED TYPE: Primary | ICD-10-CM

## 2025-04-15 DIAGNOSIS — G47.33 OSA (OBSTRUCTIVE SLEEP APNEA): ICD-10-CM

## 2025-04-15 DIAGNOSIS — I10 HYPERTENSION, UNSPECIFIED TYPE: ICD-10-CM

## 2025-04-15 PROCEDURE — 36415 COLL VENOUS BLD VENIPUNCTURE: CPT

## 2025-04-15 PROCEDURE — 80048 BASIC METABOLIC PNL TOTAL CA: CPT | Performed by: STUDENT IN AN ORGANIZED HEALTH CARE EDUCATION/TRAINING PROGRAM

## 2025-04-15 PROCEDURE — 99214 OFFICE O/P EST MOD 30 MIN: CPT | Performed by: STUDENT IN AN ORGANIZED HEALTH CARE EDUCATION/TRAINING PROGRAM

## 2025-04-16 ENCOUNTER — RESULTS FOLLOW-UP (OUTPATIENT)
Dept: FAMILY MEDICINE CLINIC | Facility: CLINIC | Age: 50
End: 2025-04-16
Payer: COMMERCIAL

## 2025-04-16 LAB
ANION GAP SERPL CALCULATED.3IONS-SCNC: 9.6 MMOL/L (ref 5–15)
BUN SERPL-MCNC: 22 MG/DL (ref 6–20)
BUN/CREAT SERPL: 12.7 (ref 7–25)
CALCIUM SPEC-SCNC: 9.2 MG/DL (ref 8.6–10.5)
CHLORIDE SERPL-SCNC: 106 MMOL/L (ref 98–107)
CO2 SERPL-SCNC: 26.4 MMOL/L (ref 22–29)
CREAT SERPL-MCNC: 1.73 MG/DL (ref 0.76–1.27)
EGFRCR SERPLBLD CKD-EPI 2021: 47.8 ML/MIN/1.73
GLUCOSE SERPL-MCNC: 86 MG/DL (ref 65–99)
POTASSIUM SERPL-SCNC: 4.5 MMOL/L (ref 3.5–5.2)
SODIUM SERPL-SCNC: 142 MMOL/L (ref 136–145)

## 2025-04-16 NOTE — PROGRESS NOTES
Chief Complaint  Resistant hypertension    HPI:   HPI   Jose Juan Cabrera is a 49 y.o. male who presents today to [facility] for Resistant hypertension.  History of Present Illness  The patient presents for evaluation of blood pressure and sleep apnea.    A general improvement in his condition is reported, with blood pressure being lower than usual prior to his school commute. The current medication regimen includes lisinopril, spironolactone, and carvedilol. Chlorthalidone was discontinued due to its low dosage. A diet devoid of potatoes and bananas is being followed to manage potassium levels.    Kidney function was noted to be perfect during the last check 13 days ago, with all tests for tumors and hormone screening returning negative results. BUN was slightly elevated at 21, but within normal limits.    The patient has not yet received the sleep study equipment, although he was informed of its dispatch. He expressed concerns about the equipment, comparing it to the mask his father used to wear. The doctor reassured him that the new equipment is simpler and quieter.       Previous History:   Past Medical History:   Diagnosis Date    Ankle fracture     Fracture of wrist     Hypertension     Leg fracture       Past Surgical History:   Procedure Laterality Date    COLONOSCOPY N/A 10/12/2023    Procedure: COLONOSCOPY FOR SCREENING;  Surgeon: Tanya Vicente MD;  Location: [facility];  Service: Gastroenterology;  Laterality: N/A;    MANDIBLE SURGERY        Social History     Socioeconomic History    Marital status:    Tobacco Use    Smoking status: Never    Smokeless tobacco: Never   Vaping Use    Vaping status: Never Used   Substance and Sexual Activity    Alcohol use: Yes     Comment: occ    Drug use: No    Sexual activity: Defer      Health Maintenance Due   Topic Date Due    TDAP/TD VACCINES (1 - Tdap) Never done    HEPATITIS C SCREENING  Never done    ANNUAL  "PHYSICAL  Never done    COVID-19 Vaccine (1 - 2024-25 season) Never done        Current Medications:  Current Outpatient Medications   Medication Sig Dispense Refill    carvedilol (COREG) 6.25 MG tablet Take 1 tablet by mouth 2 (Two) Times a Day With Meals. 60 tablet 3    lisinopril (PRINIVIL,ZESTRIL) 20 MG tablet Take 1 tablet by mouth Daily.      spironolactone (Aldactone) 50 MG tablet Take 1 tablet by mouth Daily. 30 tablet 2     No current facility-administered medications for this visit.       Allergies:   No Known Allergies    Vitals:   /84 (BP Location: Right arm, Patient Position: Sitting, Cuff Size: Adult)   Pulse 100   Temp 96.8 °F (36 °C) (Temporal)   Ht 185.4 cm (73\")   Wt 115 kg (254 lb 6.4 oz)   SpO2 96%   BMI 33.56 kg/m²     Estimated body mass index is 33.56 kg/m² as calculated from the following:    Height as of this encounter: 185.4 cm (73\").    Weight as of this encounter: 115 kg (254 lb 6.4 oz).    Jose Juan Cabrera  reports that he has never smoked. He has never used smokeless tobacco.            Physical Exam:   Physical Exam  Constitutional:       Appearance: Normal appearance.   HENT:      Mouth/Throat:      Mouth: Mucous membranes are moist.   Cardiovascular:      Rate and Rhythm: Normal rate and regular rhythm.   Pulmonary:      Effort: Pulmonary effort is normal.      Breath sounds: Normal breath sounds. No wheezing or rhonchi.   Musculoskeletal:         General: Normal range of motion.   Skin:     General: Skin is warm and dry.   Neurological:      Mental Status: He is alert.   Psychiatric:         Mood and Affect: Mood normal.          Lab Results:   Lab on 04/15/2025   Component Date Value Ref Range Status    Glucose 04/15/2025 86  65 - 99 mg/dL Final    BUN 04/15/2025 22 (H)  6 - 20 mg/dL Final    Creatinine 04/15/2025 1.73 (H)  0.76 - 1.27 mg/dL Final    Sodium 04/15/2025 142  136 - 145 mmol/L Final    Potassium 04/15/2025 4.5  3.5 - 5.2 mmol/L Final    Chloride 04/15/2025 " 106  98 - 107 mmol/L Final    CO2 04/15/2025 26.4  22.0 - 29.0 mmol/L Final    Calcium 04/15/2025 9.2  8.6 - 10.5 mg/dL Final    BUN/Creatinine Ratio 04/15/2025 12.7  7.0 - 25.0 Final    Anion Gap 04/15/2025 9.6  5.0 - 15.0 mmol/L Final    eGFR 04/15/2025 47.8 (L)  >60.0 mL/min/1.73 Final   Lab on 04/02/2025   Component Date Value Ref Range Status    Aldosterone 04/02/2025 4.1  0.0 - 30.0 ng/dL Final    Renin Activity 04/02/2025 3.464  0.167 - 5.380 ng/mL/hr Final    Aldosterone/Renin Ratio 04/02/2025 1.2  0.0 - 30.0 Final                             Units:      ng/dL per ng/mL/hr    Cortisol 04/02/2025 5.11    mcg/dL Final    Glucose 04/02/2025 79  65 - 99 mg/dL Final    BUN 04/02/2025 21 (H)  6 - 20 mg/dL Final    Creatinine 04/02/2025 1.24  0.76 - 1.27 mg/dL Final    Sodium 04/02/2025 138  136 - 145 mmol/L Final    Potassium 04/02/2025 3.8  3.5 - 5.2 mmol/L Final    Chloride 04/02/2025 104  98 - 107 mmol/L Final    CO2 04/02/2025 24.9  22.0 - 29.0 mmol/L Final    Calcium 04/02/2025 9.0  8.6 - 10.5 mg/dL Final    Albumin 04/02/2025 4.3  3.5 - 5.2 g/dL Final    Phosphorus 04/02/2025 3.4  2.5 - 4.5 mg/dL Final    Anion Gap 04/02/2025 9.1  5.0 - 15.0 mmol/L Final    BUN/Creatinine Ratio 04/02/2025 16.9  7.0 - 25.0 Final    eGFR 04/02/2025 71.3  >60.0 mL/min/1.73 Final    Normetanephrine 04/02/2025 41.0  0.0 - 218.9 pg/mL Final    Metanephrine 04/02/2025 30.5  0.0 - 88.0 pg/mL Final   Lab on 03/06/2025   Component Date Value Ref Range Status    WBC 03/06/2025 5.15  3.40 - 10.80 10*3/mm3 Final    RBC 03/06/2025 5.18  4.14 - 5.80 10*6/mm3 Final    Hemoglobin 03/06/2025 16.9  13.0 - 17.7 g/dL Final    Hematocrit 03/06/2025 48.8  37.5 - 51.0 % Final    MCV 03/06/2025 94.2  79.0 - 97.0 fL Final    MCH 03/06/2025 32.6  26.6 - 33.0 pg Final    MCHC 03/06/2025 34.6  31.5 - 35.7 g/dL Final    RDW 03/06/2025 12.5  12.3 - 15.4 % Final    RDW-SD 03/06/2025 43.6  37.0 - 54.0 fl Final    MPV 03/06/2025 11.4  6.0 - 12.0 fL Final     Platelets 03/06/2025 263  140 - 450 10*3/mm3 Final    Glucose 03/06/2025 90  65 - 99 mg/dL Final    BUN 03/06/2025 21 (H)  6 - 20 mg/dL Final    Creatinine 03/06/2025 1.38 (H)  0.76 - 1.27 mg/dL Final    Sodium 03/06/2025 139  136 - 145 mmol/L Final    Potassium 03/06/2025 4.9  3.5 - 5.2 mmol/L Final    Chloride 03/06/2025 104  98 - 107 mmol/L Final    CO2 03/06/2025 27.0  22.0 - 29.0 mmol/L Final    Calcium 03/06/2025 9.3  8.6 - 10.5 mg/dL Final    Albumin 03/06/2025 4.3  3.5 - 5.2 g/dL Final    Phosphorus 03/06/2025 3.5  2.5 - 4.5 mg/dL Final    Anion Gap 03/06/2025 8.0  5.0 - 15.0 mmol/L Final    BUN/Creatinine Ratio 03/06/2025 15.2  7.0 - 25.0 Final    eGFR 03/06/2025 62.7  >60.0 mL/min/1.73 Final    Protein/Creatinine Ratio, Urine 03/06/2025 53.0  0.0 - 200.0 mg/G Crea Final    Creatinine, Urine 03/06/2025 158.5  mg/dL Final    Total Protein, Urine 03/06/2025 8.4  mg/dL Final    Total Cholesterol 03/06/2025 124  0 - 200 mg/dL Final    Triglycerides 03/06/2025 83  0 - 150 mg/dL Final    HDL Cholesterol 03/06/2025 33 (L)  40 - 60 mg/dL Final    LDL Cholesterol  03/06/2025 75  0 - 100 mg/dL Final    VLDL Cholesterol 03/06/2025 16  5 - 40 mg/dL Final    LDL/HDL Ratio 03/06/2025 2.25   Final   Admission on 02/25/2025, Discharged on 02/26/2025   Component Date Value Ref Range Status    Glucose 02/25/2025 96  65 - 99 mg/dL Final    BUN 02/25/2025 29 (H)  6 - 20 mg/dL Final    Creatinine 02/25/2025 1.73 (H)  0.76 - 1.27 mg/dL Final    Sodium 02/25/2025 138  136 - 145 mmol/L Final    Potassium 02/25/2025 3.1 (L)  3.5 - 5.2 mmol/L Final    Chloride 02/25/2025 99  98 - 107 mmol/L Final    CO2 02/25/2025 28.9  22.0 - 29.0 mmol/L Final    Calcium 02/25/2025 8.3 (L)  8.6 - 10.5 mg/dL Final    Total Protein 02/25/2025 6.2  6.0 - 8.5 g/dL Final    Albumin 02/25/2025 3.6  3.5 - 5.2 g/dL Final    ALT (SGPT) 02/25/2025 70 (H)  1 - 41 U/L Final    AST (SGOT) 02/25/2025 56 (H)  1 - 40 U/L Final    Alkaline Phosphatase 02/25/2025 44   39 - 117 U/L Final    Total Bilirubin 02/25/2025 0.7  0.0 - 1.2 mg/dL Final    Globulin 02/25/2025 2.6  gm/dL Final    A/G Ratio 02/25/2025 1.4  g/dL Final    BUN/Creatinine Ratio 02/25/2025 16.8  7.0 - 25.0 Final    Anion Gap 02/25/2025 10.1  5.0 - 15.0 mmol/L Final    eGFR 02/25/2025 47.8 (L)  >60.0 mL/min/1.73 Final    HS Troponin T 02/25/2025 27 (H)  <22 ng/L Final    D-Dimer, Quantitative 02/25/2025 0.27  0.00 - 0.50 MCGFEU/mL Final    QT Interval 02/25/2025 474  ms Final    QTC Interval 02/25/2025 543  ms Final    WBC 02/25/2025 6.13  3.40 - 10.80 10*3/mm3 Final    RBC 02/25/2025 5.30  4.14 - 5.80 10*6/mm3 Final    Hemoglobin 02/25/2025 16.9  13.0 - 17.7 g/dL Final    Hematocrit 02/25/2025 48.3  37.5 - 51.0 % Final    MCV 02/25/2025 91.1  79.0 - 97.0 fL Final    MCH 02/25/2025 31.9  26.6 - 33.0 pg Final    MCHC 02/25/2025 35.0  31.5 - 35.7 g/dL Final    RDW 02/25/2025 12.3  12.3 - 15.4 % Final    RDW-SD 02/25/2025 40.8  37.0 - 54.0 fl Final    MPV 02/25/2025 10.3  6.0 - 12.0 fL Final    Platelets 02/25/2025 125 (L)  140 - 450 10*3/mm3 Final    Neutrophil % 02/25/2025 49.6  42.7 - 76.0 % Final    Lymphocyte % 02/25/2025 33.4  19.6 - 45.3 % Final    Monocyte % 02/25/2025 16.2 (H)  5.0 - 12.0 % Final    Eosinophil % 02/25/2025 0.3  0.3 - 6.2 % Final    Basophil % 02/25/2025 0.3  0.0 - 1.5 % Final    Immature Grans % 02/25/2025 0.2  0.0 - 0.5 % Final    Neutrophils, Absolute 02/25/2025 3.04  1.70 - 7.00 10*3/mm3 Final    Lymphocytes, Absolute 02/25/2025 2.05  0.70 - 3.10 10*3/mm3 Final    Monocytes, Absolute 02/25/2025 0.99 (H)  0.10 - 0.90 10*3/mm3 Final    Eosinophils, Absolute 02/25/2025 0.02  0.00 - 0.40 10*3/mm3 Final    Basophils, Absolute 02/25/2025 0.02  0.00 - 0.20 10*3/mm3 Final    Immature Grans, Absolute 02/25/2025 0.01  0.00 - 0.05 10*3/mm3 Final    nRBC 02/25/2025 0.0  0.0 - 0.2 /100 WBC Final    HS Troponin T 02/25/2025 22 (H)  <22 ng/L Final    Troponin T Numeric Delta 02/25/2025 -5  ng/L Final     Troponin T % Delta 02/25/2025 -19  Abnormal if >/= 20% Final    Creatine Kinase 02/25/2025 446 (H)  20 - 200 U/L Final    Magnesium 02/25/2025 1.8  1.6 - 2.6 mg/dL Final    Glucose 02/26/2025 102 (H)  65 - 99 mg/dL Final    BUN 02/26/2025 26 (H)  6 - 20 mg/dL Final    Creatinine 02/26/2025 1.52 (H)  0.76 - 1.27 mg/dL Final    Sodium 02/26/2025 135 (L)  136 - 145 mmol/L Final    Potassium 02/26/2025 3.0 (L)  3.5 - 5.2 mmol/L Final    Slight hemolysis detected by analyzer. Result may be falsely elevated.    Chloride 02/26/2025 98  98 - 107 mmol/L Final    CO2 02/26/2025 27.8  22.0 - 29.0 mmol/L Final    Calcium 02/26/2025 7.7 (L)  8.6 - 10.5 mg/dL Final    BUN/Creatinine Ratio 02/26/2025 17.1  7.0 - 25.0 Final    Anion Gap 02/26/2025 9.2  5.0 - 15.0 mmol/L Final    eGFR 02/26/2025 55.8 (L)  >60.0 mL/min/1.73 Final    WBC 02/26/2025 6.00  3.40 - 10.80 10*3/mm3 Final    RBC 02/26/2025 4.97  4.14 - 5.80 10*6/mm3 Final    Hemoglobin 02/26/2025 15.9  13.0 - 17.7 g/dL Final    Hematocrit 02/26/2025 46.3  37.5 - 51.0 % Final    MCV 02/26/2025 93.2  79.0 - 97.0 fL Final    MCH 02/26/2025 32.0  26.6 - 33.0 pg Final    MCHC 02/26/2025 34.3  31.5 - 35.7 g/dL Final    RDW 02/26/2025 12.3  12.3 - 15.4 % Final    RDW-SD 02/26/2025 42.4  37.0 - 54.0 fl Final    MPV 02/26/2025 10.5  6.0 - 12.0 fL Final    Platelets 02/26/2025 122 (L)  140 - 450 10*3/mm3 Final    Magnesium 02/26/2025 1.9  1.6 - 2.6 mg/dL Final    COVID19 02/25/2025 Not Detected  Not Detected - Ref. Range Final    Influenza A PCR 02/25/2025 Detected (A)  Not Detected Final    Influenza B PCR 02/25/2025 Not Detected  Not Detected Final    Potassium 02/26/2025 3.6  3.5 - 5.2 mmol/L Final    Slight hemolysis detected by analyzer. Result may be falsely elevated.   Infusion on 02/25/2025   Component Date Value Ref Range Status    Glucose 02/25/2025 113  70 - 130 mg/dL Final   Transcribe Orders on 02/24/2025   Component Date Value Ref Range Status    Glucose 02/24/2025 121  (H)  65 - 99 mg/dL Final    BUN 02/24/2025 18  6 - 20 mg/dL Final    Creatinine 02/24/2025 1.72 (H)  0.76 - 1.27 mg/dL Final    Sodium 02/24/2025 135 (L)  136 - 145 mmol/L Final    Potassium 02/24/2025 3.6  3.5 - 5.2 mmol/L Final    Slight hemolysis detected by analyzer. Result may be falsely elevated.    Chloride 02/24/2025 93 (L)  98 - 107 mmol/L Final    CO2 02/24/2025 30.1 (H)  22.0 - 29.0 mmol/L Final    Calcium 02/24/2025 9.2  8.6 - 10.5 mg/dL Final    Total Protein 02/24/2025 7.4  6.0 - 8.5 g/dL Final    Albumin 02/24/2025 4.5  3.5 - 5.2 g/dL Final    ALT (SGPT) 02/24/2025 78 (H)  1 - 41 U/L Final    AST (SGOT) 02/24/2025 51 (H)  1 - 40 U/L Final    Alkaline Phosphatase 02/24/2025 56  39 - 117 U/L Final    Total Bilirubin 02/24/2025 1.1  0.0 - 1.2 mg/dL Final    Globulin 02/24/2025 2.9  gm/dL Final    A/G Ratio 02/24/2025 1.6  g/dL Final    BUN/Creatinine Ratio 02/24/2025 10.5  7.0 - 25.0 Final    Anion Gap 02/24/2025 11.9  5.0 - 15.0 mmol/L Final    eGFR 02/24/2025 48.1 (L)  >60.0 mL/min/1.73 Final   Lab Requisition on 12/23/2024   Component Date Value Ref Range Status    Rubella Antibodies, IgG 12/23/2024 10.00  Immune >0.99 index Final                                    Non-immune       <0.90                                  Equivocal  0.90 - 0.99                                  Immune           >0.99    Rubeola IgG 12/23/2024 <13.5 (L)  Immune >16.4 AU/mL Final                                     Negative        <13.5                                   Equivocal 13.5 - 16.4                                   Positive        >16.4  Presence of antibodies to Rubeola is presumptive evidence  of immunity except when acute infection is suspected.    Varicella IgG 12/23/2024 Reactive  Non Reactive Final    **Please note reference interval change**  A Reactive result is considered evidence of immunity to VZV.  Reactive indicates that VZV IgG was detected consistent with  previous infection and/or vaccination.  A  Non Reactive result indicates that VZV IgG was not detected  suggesting that immunity has not been acquired.    Mumps IgG 12/23/2024 <9.0 (L)  Immune >10.9 AU/mL Final                                    Negative         <9.0                                  Equivocal  9.0 - 10.9                                  Positive        >10.9  A positive result generally indicates past exposure to  Mumps virus or previous vaccination.    Hep B S Ab 12/23/2024 Non-Reactive   Final    QuantiFERON Incubation 12/23/2024 Incubation performed.   Final    QUANTIFERON-TB GOLD PLUS 12/23/2024 Negative  Negative Final    No response to M tuberculosis antigens detected.  Infection with M tuberculosis is unlikely, but high risk  individuals should be considered for additional testing  (ATS/IDSA/CDC Clinical Practice Guidelines, 2017). The  reference range is an Antigen minus Nil result of <0.35 IU/mL.  Chemiluminescence immunoassay methodology    QuantiFERON Criteria 12/23/2024 Comment   Final    QuantiFERON-TB Gold Plus is a qualitative indirect test for  M tuberculosis infection (including disease) and is intended for use  in conjunction with risk assessment, radiography, and other medical  and diagnostic evaluations. The QuantiFERON-TB Gold Plus result is  determined by subtracting the Nil value from either TB antigen (Ag)  value. The Mitogen tube serves as a control for the test.    QUANTIFERON TB1 AG VALUE 12/23/2024 0.03  IU/mL Final    QUANTIFERON TB2 AG VALUE 12/23/2024 0.04  IU/mL Final    QuantiFERON Nil Value 12/23/2024 0.01  IU/mL Final    QuantiFERON Mitogen Value 12/23/2024 >10.00  IU/mL Final       Assessment and Plan  Diagnoses and all orders for this visit:    1. Hypertension, unspecified type (Primary)  -     Basic metabolic panel; Future    2. CHRISTIAN (obstructive sleep apnea)      Assessment & Plan  1. Hypertension.  - Blood pressure is currently well-managed with lisinopril, spironolactone, and carvedilol.  - Kidney  function had returned to normal 13 days ago; BUN level was slightly elevated at 21.  - Discussed the importance of hydration and potential hyperkalemia due to medication.  - A BMP will be conducted today to monitor potassium levels.    2. Sleep apnea.  - Sleep apnea is suspected to be a contributing factor to hypertension.  - Advised to complete the sleep study within the next 6 weeks.  - If sleep apnea is confirmed, treatment with a CPAP machine will be initiated.  - Follow-up appointment scheduled in 6 weeks to review sleep study results and discuss further management.               New Medications:   No orders of the defined types were placed in this encounter.      Discontinued Medications:   Medications Discontinued During This Encounter   Medication Reason    chlorthalidone (HYGROTON) 25 MG tablet *Therapy completed                 Follow Up:   Return in about 6 weeks (around 5/27/2025), or needs labs.    Patient was given instructions and counseling regarding his condition or for health maintenance advice. Please see specific information pulled into the AVS if appropriate.     Patient or patient representative verbalized consent for the use of Ambient Listening during the visit with  Ruddy Woodson DO for chart documentation. 4/16/2025  08:19 EDT       This document has been electronically signed by Ruddy Woodson DO   April 16, 2025 08:19 EDT      Dictated Utilizing Dragon Dictation: Part of this note may be an electronic transcription/translation of spoken language to printed text using the Dragon Dictation System.

## 2025-05-27 ENCOUNTER — OFFICE VISIT (OUTPATIENT)
Dept: FAMILY MEDICINE CLINIC | Facility: CLINIC | Age: 50
End: 2025-05-27
Payer: COMMERCIAL

## 2025-05-27 ENCOUNTER — LAB (OUTPATIENT)
Dept: FAMILY MEDICINE CLINIC | Facility: CLINIC | Age: 50
End: 2025-05-27
Payer: COMMERCIAL

## 2025-05-27 VITALS
SYSTOLIC BLOOD PRESSURE: 118 MMHG | HEART RATE: 81 BPM | TEMPERATURE: 96.9 F | HEIGHT: 73 IN | DIASTOLIC BLOOD PRESSURE: 84 MMHG | OXYGEN SATURATION: 99 % | WEIGHT: 257.6 LBS | BODY MASS INDEX: 34.14 KG/M2

## 2025-05-27 DIAGNOSIS — N18.31 CKD STAGE 3A, GFR 45-59 ML/MIN: ICD-10-CM

## 2025-05-27 DIAGNOSIS — N18.31 CKD STAGE 3A, GFR 45-59 ML/MIN: Primary | ICD-10-CM

## 2025-05-27 DIAGNOSIS — I1A.0 RESISTANT HYPERTENSION: ICD-10-CM

## 2025-05-27 PROCEDURE — 99214 OFFICE O/P EST MOD 30 MIN: CPT | Performed by: STUDENT IN AN ORGANIZED HEALTH CARE EDUCATION/TRAINING PROGRAM

## 2025-05-27 PROCEDURE — 84439 ASSAY OF FREE THYROXINE: CPT | Performed by: NURSE PRACTITIONER

## 2025-05-27 PROCEDURE — 84480 ASSAY TRIIODOTHYRONINE (T3): CPT | Performed by: NURSE PRACTITIONER

## 2025-05-27 PROCEDURE — 83036 HEMOGLOBIN GLYCOSYLATED A1C: CPT | Performed by: NURSE PRACTITIONER

## 2025-05-27 PROCEDURE — 84443 ASSAY THYROID STIM HORMONE: CPT | Performed by: NURSE PRACTITIONER

## 2025-05-27 PROCEDURE — 80048 BASIC METABOLIC PNL TOTAL CA: CPT | Performed by: NURSE PRACTITIONER

## 2025-05-27 RX ORDER — SPIRONOLACTONE 50 MG/1
50 TABLET, FILM COATED ORAL DAILY
Qty: 90 TABLET | Refills: 1 | Status: SHIPPED | OUTPATIENT
Start: 2025-05-27

## 2025-05-27 RX ORDER — CARVEDILOL 6.25 MG/1
6.25 TABLET ORAL 2 TIMES DAILY WITH MEALS
Qty: 60 TABLET | Refills: 3 | Status: CANCELLED | OUTPATIENT
Start: 2025-05-27

## 2025-05-27 RX ORDER — LISINOPRIL 20 MG/1
20 TABLET ORAL DAILY
Qty: 90 TABLET | Refills: 1 | Status: SHIPPED | OUTPATIENT
Start: 2025-05-27

## 2025-05-27 NOTE — PROGRESS NOTES
Chief Complaint  Hypertension, unspecified type    HPI:   SAUMYA Cabrera is a 49 y.o. male who presents today to [facility] for Hypertension, unspecified type.  History of Present Illness  The patient presents for evaluation of blood pressure and chronic kidney disease.    He has initiated the use of a CPAP machine, which has resulted in improved sleep quality, although the duration remains at approximately 4.5 to 5 hours per night. He reports a slight decrease in daytime sleepiness. He has been using the CPAP machine for the past 2 weeks.    He has observed a minor decrease in his blood pressure and plans to reduce his carvedilol dosage to one tablet for a trial period of several weeks. He recalls a previous episode of dehydration, during which his blood pressure dropped to 60/35, necessitating hospitalization for fluid replacement. Prior to this incident, he experienced hypokalemia.    His diet includes blueberries and raspberries, and he occasionally consumes whey protein. He speculates that the whey protein may be contributing to his elevated creatinine levels.       Previous History:   Past Medical History:   Diagnosis Date    Ankle fracture     Fracture of wrist     Hypertension     Leg fracture       Past Surgical History:   Procedure Laterality Date    COLONOSCOPY N/A 10/12/2023    Procedure: COLONOSCOPY FOR SCREENING;  Surgeon: Tanya Vicente MD;  Location: [facility];  Service: Gastroenterology;  Laterality: N/A;    MANDIBLE SURGERY        Social History     Socioeconomic History    Marital status:    Tobacco Use    Smoking status: Never    Smokeless tobacco: Never   Vaping Use    Vaping status: Never Used   Substance and Sexual Activity    Alcohol use: Yes     Comment: occ    Drug use: No    Sexual activity: Defer      Health Maintenance Due   Topic Date Due    TDAP/TD VACCINES (1 - Tdap) Never done    HEPATITIS C SCREENING  Never done    ANNUAL  "PHYSICAL  Never done    COVID-19 Vaccine (1 - 2024-25 season) Never done        Current Medications:  Current Outpatient Medications   Medication Sig Dispense Refill    carvedilol (COREG) 6.25 MG tablet Take 1 tablet by mouth 2 (Two) Times a Day With Meals. 60 tablet 3    lisinopril (PRINIVIL,ZESTRIL) 20 MG tablet Take 1 tablet by mouth Daily. 90 tablet 1    spironolactone (Aldactone) 50 MG tablet Take 1 tablet by mouth Daily. 90 tablet 1     No current facility-administered medications for this visit.       Allergies:   No Known Allergies    Vitals:   /84 (BP Location: Right arm, Patient Position: Sitting, Cuff Size: Adult)   Pulse 81   Temp 96.9 °F (36.1 °C) (Temporal)   Ht 185.4 cm (73\")   Wt 117 kg (257 lb 9.6 oz)   SpO2 99%   BMI 33.99 kg/m²     Estimated body mass index is 33.99 kg/m² as calculated from the following:    Height as of this encounter: 185.4 cm (73\").    Weight as of this encounter: 117 kg (257 lb 9.6 oz).    Jose Juan Cabrera  reports that he has never smoked. He has never used smokeless tobacco.            Physical Exam:   Physical Exam  Constitutional:       Appearance: Normal appearance.   HENT:      Mouth/Throat:      Mouth: Mucous membranes are moist.   Cardiovascular:      Rate and Rhythm: Normal rate and regular rhythm.   Pulmonary:      Effort: Pulmonary effort is normal.      Breath sounds: Normal breath sounds. No wheezing or rhonchi.   Musculoskeletal:         General: Normal range of motion.   Skin:     General: Skin is warm and dry.   Neurological:      Mental Status: He is alert.   Psychiatric:         Mood and Affect: Mood normal.          Lab Results:   Lab on 04/15/2025   Component Date Value Ref Range Status    Glucose 04/15/2025 86  65 - 99 mg/dL Final    BUN 04/15/2025 22 (H)  6 - 20 mg/dL Final    Creatinine 04/15/2025 1.73 (H)  0.76 - 1.27 mg/dL Final    Sodium 04/15/2025 142  136 - 145 mmol/L Final    Potassium 04/15/2025 4.5  3.5 - 5.2 mmol/L Final    Chloride " 04/15/2025 106  98 - 107 mmol/L Final    CO2 04/15/2025 26.4  22.0 - 29.0 mmol/L Final    Calcium 04/15/2025 9.2  8.6 - 10.5 mg/dL Final    BUN/Creatinine Ratio 04/15/2025 12.7  7.0 - 25.0 Final    Anion Gap 04/15/2025 9.6  5.0 - 15.0 mmol/L Final    eGFR 04/15/2025 47.8 (L)  >60.0 mL/min/1.73 Final   Lab on 04/02/2025   Component Date Value Ref Range Status    Aldosterone 04/02/2025 4.1  0.0 - 30.0 ng/dL Final    Renin Activity 04/02/2025 3.464  0.167 - 5.380 ng/mL/hr Final    Aldosterone/Renin Ratio 04/02/2025 1.2  0.0 - 30.0 Final                             Units:      ng/dL per ng/mL/hr    Cortisol 04/02/2025 5.11    mcg/dL Final    Glucose 04/02/2025 79  65 - 99 mg/dL Final    BUN 04/02/2025 21 (H)  6 - 20 mg/dL Final    Creatinine 04/02/2025 1.24  0.76 - 1.27 mg/dL Final    Sodium 04/02/2025 138  136 - 145 mmol/L Final    Potassium 04/02/2025 3.8  3.5 - 5.2 mmol/L Final    Chloride 04/02/2025 104  98 - 107 mmol/L Final    CO2 04/02/2025 24.9  22.0 - 29.0 mmol/L Final    Calcium 04/02/2025 9.0  8.6 - 10.5 mg/dL Final    Albumin 04/02/2025 4.3  3.5 - 5.2 g/dL Final    Phosphorus 04/02/2025 3.4  2.5 - 4.5 mg/dL Final    Anion Gap 04/02/2025 9.1  5.0 - 15.0 mmol/L Final    BUN/Creatinine Ratio 04/02/2025 16.9  7.0 - 25.0 Final    eGFR 04/02/2025 71.3  >60.0 mL/min/1.73 Final    Normetanephrine 04/02/2025 41.0  0.0 - 218.9 pg/mL Final    Metanephrine 04/02/2025 30.5  0.0 - 88.0 pg/mL Final   Lab on 03/06/2025   Component Date Value Ref Range Status    WBC 03/06/2025 5.15  3.40 - 10.80 10*3/mm3 Final    RBC 03/06/2025 5.18  4.14 - 5.80 10*6/mm3 Final    Hemoglobin 03/06/2025 16.9  13.0 - 17.7 g/dL Final    Hematocrit 03/06/2025 48.8  37.5 - 51.0 % Final    MCV 03/06/2025 94.2  79.0 - 97.0 fL Final    MCH 03/06/2025 32.6  26.6 - 33.0 pg Final    MCHC 03/06/2025 34.6  31.5 - 35.7 g/dL Final    RDW 03/06/2025 12.5  12.3 - 15.4 % Final    RDW-SD 03/06/2025 43.6  37.0 - 54.0 fl Final    MPV 03/06/2025 11.4  6.0 - 12.0  fL Final    Platelets 03/06/2025 263  140 - 450 10*3/mm3 Final    Glucose 03/06/2025 90  65 - 99 mg/dL Final    BUN 03/06/2025 21 (H)  6 - 20 mg/dL Final    Creatinine 03/06/2025 1.38 (H)  0.76 - 1.27 mg/dL Final    Sodium 03/06/2025 139  136 - 145 mmol/L Final    Potassium 03/06/2025 4.9  3.5 - 5.2 mmol/L Final    Chloride 03/06/2025 104  98 - 107 mmol/L Final    CO2 03/06/2025 27.0  22.0 - 29.0 mmol/L Final    Calcium 03/06/2025 9.3  8.6 - 10.5 mg/dL Final    Albumin 03/06/2025 4.3  3.5 - 5.2 g/dL Final    Phosphorus 03/06/2025 3.5  2.5 - 4.5 mg/dL Final    Anion Gap 03/06/2025 8.0  5.0 - 15.0 mmol/L Final    BUN/Creatinine Ratio 03/06/2025 15.2  7.0 - 25.0 Final    eGFR 03/06/2025 62.7  >60.0 mL/min/1.73 Final    Protein/Creatinine Ratio, Urine 03/06/2025 53.0  0.0 - 200.0 mg/G Crea Final    Creatinine, Urine 03/06/2025 158.5  mg/dL Final    Total Protein, Urine 03/06/2025 8.4  mg/dL Final    Total Cholesterol 03/06/2025 124  0 - 200 mg/dL Final    Triglycerides 03/06/2025 83  0 - 150 mg/dL Final    HDL Cholesterol 03/06/2025 33 (L)  40 - 60 mg/dL Final    LDL Cholesterol  03/06/2025 75  0 - 100 mg/dL Final    VLDL Cholesterol 03/06/2025 16  5 - 40 mg/dL Final    LDL/HDL Ratio 03/06/2025 2.25   Final   Admission on 02/25/2025, Discharged on 02/26/2025   Component Date Value Ref Range Status    Glucose 02/25/2025 96  65 - 99 mg/dL Final    BUN 02/25/2025 29 (H)  6 - 20 mg/dL Final    Creatinine 02/25/2025 1.73 (H)  0.76 - 1.27 mg/dL Final    Sodium 02/25/2025 138  136 - 145 mmol/L Final    Potassium 02/25/2025 3.1 (L)  3.5 - 5.2 mmol/L Final    Chloride 02/25/2025 99  98 - 107 mmol/L Final    CO2 02/25/2025 28.9  22.0 - 29.0 mmol/L Final    Calcium 02/25/2025 8.3 (L)  8.6 - 10.5 mg/dL Final    Total Protein 02/25/2025 6.2  6.0 - 8.5 g/dL Final    Albumin 02/25/2025 3.6  3.5 - 5.2 g/dL Final    ALT (SGPT) 02/25/2025 70 (H)  1 - 41 U/L Final    AST (SGOT) 02/25/2025 56 (H)  1 - 40 U/L Final    Alkaline Phosphatase  02/25/2025 44  39 - 117 U/L Final    Total Bilirubin 02/25/2025 0.7  0.0 - 1.2 mg/dL Final    Globulin 02/25/2025 2.6  gm/dL Final    A/G Ratio 02/25/2025 1.4  g/dL Final    BUN/Creatinine Ratio 02/25/2025 16.8  7.0 - 25.0 Final    Anion Gap 02/25/2025 10.1  5.0 - 15.0 mmol/L Final    eGFR 02/25/2025 47.8 (L)  >60.0 mL/min/1.73 Final    HS Troponin T 02/25/2025 27 (H)  <22 ng/L Final    D-Dimer, Quantitative 02/25/2025 0.27  0.00 - 0.50 MCGFEU/mL Final    QT Interval 02/25/2025 474  ms Final    QTC Interval 02/25/2025 543  ms Final    WBC 02/25/2025 6.13  3.40 - 10.80 10*3/mm3 Final    RBC 02/25/2025 5.30  4.14 - 5.80 10*6/mm3 Final    Hemoglobin 02/25/2025 16.9  13.0 - 17.7 g/dL Final    Hematocrit 02/25/2025 48.3  37.5 - 51.0 % Final    MCV 02/25/2025 91.1  79.0 - 97.0 fL Final    MCH 02/25/2025 31.9  26.6 - 33.0 pg Final    MCHC 02/25/2025 35.0  31.5 - 35.7 g/dL Final    RDW 02/25/2025 12.3  12.3 - 15.4 % Final    RDW-SD 02/25/2025 40.8  37.0 - 54.0 fl Final    MPV 02/25/2025 10.3  6.0 - 12.0 fL Final    Platelets 02/25/2025 125 (L)  140 - 450 10*3/mm3 Final    Neutrophil % 02/25/2025 49.6  42.7 - 76.0 % Final    Lymphocyte % 02/25/2025 33.4  19.6 - 45.3 % Final    Monocyte % 02/25/2025 16.2 (H)  5.0 - 12.0 % Final    Eosinophil % 02/25/2025 0.3  0.3 - 6.2 % Final    Basophil % 02/25/2025 0.3  0.0 - 1.5 % Final    Immature Grans % 02/25/2025 0.2  0.0 - 0.5 % Final    Neutrophils, Absolute 02/25/2025 3.04  1.70 - 7.00 10*3/mm3 Final    Lymphocytes, Absolute 02/25/2025 2.05  0.70 - 3.10 10*3/mm3 Final    Monocytes, Absolute 02/25/2025 0.99 (H)  0.10 - 0.90 10*3/mm3 Final    Eosinophils, Absolute 02/25/2025 0.02  0.00 - 0.40 10*3/mm3 Final    Basophils, Absolute 02/25/2025 0.02  0.00 - 0.20 10*3/mm3 Final    Immature Grans, Absolute 02/25/2025 0.01  0.00 - 0.05 10*3/mm3 Final    nRBC 02/25/2025 0.0  0.0 - 0.2 /100 WBC Final    HS Troponin T 02/25/2025 22 (H)  <22 ng/L Final    Troponin T Numeric Delta 02/25/2025 -5   ng/L Final    Troponin T % Delta 02/25/2025 -19  Abnormal if >/= 20% Final    Creatine Kinase 02/25/2025 446 (H)  20 - 200 U/L Final    Magnesium 02/25/2025 1.8  1.6 - 2.6 mg/dL Final    Glucose 02/26/2025 102 (H)  65 - 99 mg/dL Final    BUN 02/26/2025 26 (H)  6 - 20 mg/dL Final    Creatinine 02/26/2025 1.52 (H)  0.76 - 1.27 mg/dL Final    Sodium 02/26/2025 135 (L)  136 - 145 mmol/L Final    Potassium 02/26/2025 3.0 (L)  3.5 - 5.2 mmol/L Final    Slight hemolysis detected by analyzer. Result may be falsely elevated.    Chloride 02/26/2025 98  98 - 107 mmol/L Final    CO2 02/26/2025 27.8  22.0 - 29.0 mmol/L Final    Calcium 02/26/2025 7.7 (L)  8.6 - 10.5 mg/dL Final    BUN/Creatinine Ratio 02/26/2025 17.1  7.0 - 25.0 Final    Anion Gap 02/26/2025 9.2  5.0 - 15.0 mmol/L Final    eGFR 02/26/2025 55.8 (L)  >60.0 mL/min/1.73 Final    WBC 02/26/2025 6.00  3.40 - 10.80 10*3/mm3 Final    RBC 02/26/2025 4.97  4.14 - 5.80 10*6/mm3 Final    Hemoglobin 02/26/2025 15.9  13.0 - 17.7 g/dL Final    Hematocrit 02/26/2025 46.3  37.5 - 51.0 % Final    MCV 02/26/2025 93.2  79.0 - 97.0 fL Final    MCH 02/26/2025 32.0  26.6 - 33.0 pg Final    MCHC 02/26/2025 34.3  31.5 - 35.7 g/dL Final    RDW 02/26/2025 12.3  12.3 - 15.4 % Final    RDW-SD 02/26/2025 42.4  37.0 - 54.0 fl Final    MPV 02/26/2025 10.5  6.0 - 12.0 fL Final    Platelets 02/26/2025 122 (L)  140 - 450 10*3/mm3 Final    Magnesium 02/26/2025 1.9  1.6 - 2.6 mg/dL Final    COVID19 02/25/2025 Not Detected  Not Detected - Ref. Range Final    Influenza A PCR 02/25/2025 Detected (A)  Not Detected Final    Influenza B PCR 02/25/2025 Not Detected  Not Detected Final    Potassium 02/26/2025 3.6  3.5 - 5.2 mmol/L Final    Slight hemolysis detected by analyzer. Result may be falsely elevated.   Infusion on 02/25/2025   Component Date Value Ref Range Status    Glucose 02/25/2025 113  70 - 130 mg/dL Final   Transcribe Orders on 02/24/2025   Component Date Value Ref Range Status    Glucose  02/24/2025 121 (H)  65 - 99 mg/dL Final    BUN 02/24/2025 18  6 - 20 mg/dL Final    Creatinine 02/24/2025 1.72 (H)  0.76 - 1.27 mg/dL Final    Sodium 02/24/2025 135 (L)  136 - 145 mmol/L Final    Potassium 02/24/2025 3.6  3.5 - 5.2 mmol/L Final    Slight hemolysis detected by analyzer. Result may be falsely elevated.    Chloride 02/24/2025 93 (L)  98 - 107 mmol/L Final    CO2 02/24/2025 30.1 (H)  22.0 - 29.0 mmol/L Final    Calcium 02/24/2025 9.2  8.6 - 10.5 mg/dL Final    Total Protein 02/24/2025 7.4  6.0 - 8.5 g/dL Final    Albumin 02/24/2025 4.5  3.5 - 5.2 g/dL Final    ALT (SGPT) 02/24/2025 78 (H)  1 - 41 U/L Final    AST (SGOT) 02/24/2025 51 (H)  1 - 40 U/L Final    Alkaline Phosphatase 02/24/2025 56  39 - 117 U/L Final    Total Bilirubin 02/24/2025 1.1  0.0 - 1.2 mg/dL Final    Globulin 02/24/2025 2.9  gm/dL Final    A/G Ratio 02/24/2025 1.6  g/dL Final    BUN/Creatinine Ratio 02/24/2025 10.5  7.0 - 25.0 Final    Anion Gap 02/24/2025 11.9  5.0 - 15.0 mmol/L Final    eGFR 02/24/2025 48.1 (L)  >60.0 mL/min/1.73 Final   Lab Requisition on 12/23/2024   Component Date Value Ref Range Status    Rubella Antibodies, IgG 12/23/2024 10.00  Immune >0.99 index Final                                    Non-immune       <0.90                                  Equivocal  0.90 - 0.99                                  Immune           >0.99    Rubeola IgG 12/23/2024 <13.5 (L)  Immune >16.4 AU/mL Final                                     Negative        <13.5                                   Equivocal 13.5 - 16.4                                   Positive        >16.4  Presence of antibodies to Rubeola is presumptive evidence  of immunity except when acute infection is suspected.    Varicella IgG 12/23/2024 Reactive  Non Reactive Final    **Please note reference interval change**  A Reactive result is considered evidence of immunity to VZV.  Reactive indicates that VZV IgG was detected consistent with  previous infection and/or  vaccination.  A Non Reactive result indicates that VZV IgG was not detected  suggesting that immunity has not been acquired.    Mumps IgG 12/23/2024 <9.0 (L)  Immune >10.9 AU/mL Final                                    Negative         <9.0                                  Equivocal  9.0 - 10.9                                  Positive        >10.9  A positive result generally indicates past exposure to  Mumps virus or previous vaccination.    Hep B S Ab 12/23/2024 Non-Reactive   Final    QuantiFERON Incubation 12/23/2024 Incubation performed.   Final    QUANTIFERON-TB GOLD PLUS 12/23/2024 Negative  Negative Final    No response to M tuberculosis antigens detected.  Infection with M tuberculosis is unlikely, but high risk  individuals should be considered for additional testing  (ATS/IDSA/CDC Clinical Practice Guidelines, 2017). The  reference range is an Antigen minus Nil result of <0.35 IU/mL.  Chemiluminescence immunoassay methodology    QuantiFERON Criteria 12/23/2024 Comment   Final    QuantiFERON-TB Gold Plus is a qualitative indirect test for  M tuberculosis infection (including disease) and is intended for use  in conjunction with risk assessment, radiography, and other medical  and diagnostic evaluations. The QuantiFERON-TB Gold Plus result is  determined by subtracting the Nil value from either TB antigen (Ag)  value. The Mitogen tube serves as a control for the test.    QUANTIFERON TB1 AG VALUE 12/23/2024 0.03  IU/mL Final    QUANTIFERON TB2 AG VALUE 12/23/2024 0.04  IU/mL Final    QuantiFERON Nil Value 12/23/2024 0.01  IU/mL Final    QuantiFERON Mitogen Value 12/23/2024 >10.00  IU/mL Final       Assessment and Plan  Diagnoses and all orders for this visit:    1. CKD stage 3a, GFR 45-59 ml/min (Primary)  -     Basic metabolic panel; Future    2. Resistant hypertension  -     lisinopril (PRINIVIL,ZESTRIL) 20 MG tablet; Take 1 tablet by mouth Daily.  Dispense: 90 tablet; Refill: 1  -     spironolactone  (Aldactone) 50 MG tablet; Take 1 tablet by mouth Daily.  Dispense: 90 tablet; Refill: 1      Assessment & Plan  1. Hypertension  - Blood pressure has improved <130/80  - Physical exam shows stable blood pressure.  - Discussed the plan to discontinue carvedilol and monitor blood pressure. If stable, reduce spironolactone 50 mg by half before stopping it completely. Continue lisinopril.  - Advised to maintain blood pressure at 130/80 consistently.    2. Chronic kidney disease, stage 3A.  - Renal function is stable but slightly abnormal.  - Lab results indicate stage 3A chronic kidney disease.  - Reviewed the importance of maintaining current renal function stability.  - Continue monitoring kidney function through regular labs.    3. Sleep apnea.  - Started using a CPAP machine 2 weeks ago.  - Reports improved sleep quality and feeling less sleepy, with 4.5 to 5 hours of sleep per night.  - Discussed the benefits of continued CPAP use.  - Recommended to continue using the CPAP machine.    Follow-up  - Follow-up appointment scheduled in 3 months to reassess kidney function and blood pressure.               New Medications:   New Medications Ordered This Visit   Medications    lisinopril (PRINIVIL,ZESTRIL) 20 MG tablet     Sig: Take 1 tablet by mouth Daily.     Dispense:  90 tablet     Refill:  1    spironolactone (Aldactone) 50 MG tablet     Sig: Take 1 tablet by mouth Daily.     Dispense:  90 tablet     Refill:  1       Discontinued Medications:   Medications Discontinued During This Encounter   Medication Reason    lisinopril (PRINIVIL,ZESTRIL) 20 MG tablet Reorder    spironolactone (Aldactone) 50 MG tablet Reorder                 Follow Up:   Return in about 3 months (around 8/27/2025), or Needs labs.    Patient was given instructions and counseling regarding his condition or for health maintenance advice. Please see specific information pulled into the AVS if appropriate.     Patient or patient representative  verbalized consent for the use of Ambient Listening during the visit with  Ruddy Woodson DO for chart documentation. 5/27/2025  15:51 EDT       This document has been electronically signed by Ruddy Woodson DO   May 27, 2025 15:51 EDT      Dictated Utilizing Dragon Dictation: Part of this note may be an electronic transcription/translation of spoken language to printed text using the Dragon Dictation System.

## 2025-05-28 ENCOUNTER — RESULTS FOLLOW-UP (OUTPATIENT)
Dept: FAMILY MEDICINE CLINIC | Facility: CLINIC | Age: 50
End: 2025-05-28
Payer: COMMERCIAL

## 2025-05-28 LAB
ALBUMIN SERPL-MCNC: 4.4 G/DL (ref 3.5–5.2)
ALP SERPL-CCNC: 57 U/L (ref 39–117)
ALT SERPL W P-5'-P-CCNC: 75 U/L (ref 1–41)
ANION GAP SERPL CALCULATED.3IONS-SCNC: 9.7 MMOL/L (ref 5–15)
AST SERPL-CCNC: 40 U/L (ref 1–40)
BILIRUB CONJ SERPL-MCNC: 0.2 MG/DL (ref 0–0.3)
BILIRUB INDIRECT SERPL-MCNC: 0.6 MG/DL
BILIRUB SERPL-MCNC: 0.8 MG/DL (ref 0–1.2)
BUN SERPL-MCNC: 22 MG/DL (ref 6–20)
BUN/CREAT SERPL: 13.2 (ref 7–25)
CALCIUM SPEC-SCNC: 9.3 MG/DL (ref 8.6–10.5)
CHLORIDE SERPL-SCNC: 101 MMOL/L (ref 98–107)
CO2 SERPL-SCNC: 26.3 MMOL/L (ref 22–29)
CREAT SERPL-MCNC: 1.67 MG/DL (ref 0.76–1.27)
EGFRCR SERPLBLD CKD-EPI 2021: 49.9 ML/MIN/1.73
GLUCOSE SERPL-MCNC: 86 MG/DL (ref 65–99)
HBA1C MFR BLD: 5.1 % (ref 4.8–5.6)
POTASSIUM SERPL-SCNC: 4.7 MMOL/L (ref 3.5–5.2)
PROT SERPL-MCNC: 6.6 G/DL (ref 6–8.5)
SODIUM SERPL-SCNC: 137 MMOL/L (ref 136–145)
T3 SERPL-MCNC: 106 NG/DL (ref 80–200)
T4 FREE SERPL-MCNC: 1.4 NG/DL (ref 0.92–1.68)
TSH SERPL DL<=0.05 MIU/L-ACNC: 1.1 UIU/ML (ref 0.27–4.2)

## 2025-08-29 ENCOUNTER — OFFICE VISIT (OUTPATIENT)
Dept: FAMILY MEDICINE CLINIC | Facility: CLINIC | Age: 50
End: 2025-08-29
Payer: COMMERCIAL

## 2025-08-29 VITALS
SYSTOLIC BLOOD PRESSURE: 138 MMHG | BODY MASS INDEX: 33.72 KG/M2 | HEART RATE: 89 BPM | TEMPERATURE: 97.1 F | WEIGHT: 254.4 LBS | OXYGEN SATURATION: 97 % | HEIGHT: 73 IN | DIASTOLIC BLOOD PRESSURE: 96 MMHG

## 2025-08-29 DIAGNOSIS — M15.0 PRIMARY OSTEOARTHRITIS INVOLVING MULTIPLE JOINTS: ICD-10-CM

## 2025-08-29 DIAGNOSIS — N18.31 CKD STAGE 3A, GFR 45-59 ML/MIN: ICD-10-CM

## 2025-08-29 DIAGNOSIS — R79.89 ELEVATED LFTS: ICD-10-CM

## 2025-08-29 DIAGNOSIS — G47.33 OSA (OBSTRUCTIVE SLEEP APNEA): ICD-10-CM

## 2025-08-29 DIAGNOSIS — I1A.0 RESISTANT HYPERTENSION: Primary | ICD-10-CM

## 2025-08-29 PROCEDURE — 86704 HEP B CORE ANTIBODY TOTAL: CPT | Performed by: STUDENT IN AN ORGANIZED HEALTH CARE EDUCATION/TRAINING PROGRAM

## 2025-08-29 PROCEDURE — 82728 ASSAY OF FERRITIN: CPT | Performed by: STUDENT IN AN ORGANIZED HEALTH CARE EDUCATION/TRAINING PROGRAM

## 2025-08-29 PROCEDURE — 82103 ALPHA-1-ANTITRYPSIN TOTAL: CPT | Performed by: STUDENT IN AN ORGANIZED HEALTH CARE EDUCATION/TRAINING PROGRAM

## 2025-08-29 PROCEDURE — 86803 HEPATITIS C AB TEST: CPT | Performed by: STUDENT IN AN ORGANIZED HEALTH CARE EDUCATION/TRAINING PROGRAM

## 2025-08-29 PROCEDURE — 99214 OFFICE O/P EST MOD 30 MIN: CPT | Performed by: STUDENT IN AN ORGANIZED HEALTH CARE EDUCATION/TRAINING PROGRAM

## 2025-08-29 PROCEDURE — 86317 IMMUNOASSAY INFECTIOUS AGENT: CPT | Performed by: STUDENT IN AN ORGANIZED HEALTH CARE EDUCATION/TRAINING PROGRAM

## 2025-08-29 PROCEDURE — 87340 HEPATITIS B SURFACE AG IA: CPT | Performed by: STUDENT IN AN ORGANIZED HEALTH CARE EDUCATION/TRAINING PROGRAM

## 2025-08-29 PROCEDURE — 85025 COMPLETE CBC W/AUTO DIFF WBC: CPT | Performed by: STUDENT IN AN ORGANIZED HEALTH CARE EDUCATION/TRAINING PROGRAM

## 2025-08-29 PROCEDURE — 82570 ASSAY OF URINE CREATININE: CPT | Performed by: STUDENT IN AN ORGANIZED HEALTH CARE EDUCATION/TRAINING PROGRAM

## 2025-08-29 PROCEDURE — 80053 COMPREHEN METABOLIC PANEL: CPT | Performed by: STUDENT IN AN ORGANIZED HEALTH CARE EDUCATION/TRAINING PROGRAM

## 2025-08-29 PROCEDURE — G0432 EIA HIV-1/HIV-2 SCREEN: HCPCS | Performed by: STUDENT IN AN ORGANIZED HEALTH CARE EDUCATION/TRAINING PROGRAM

## 2025-08-29 PROCEDURE — 84156 ASSAY OF PROTEIN URINE: CPT | Performed by: STUDENT IN AN ORGANIZED HEALTH CARE EDUCATION/TRAINING PROGRAM

## 2025-08-29 RX ORDER — SPIRONOLACTONE 50 MG/1
50 TABLET, FILM COATED ORAL DAILY
Qty: 90 TABLET | Refills: 1 | Status: SHIPPED | OUTPATIENT
Start: 2025-08-29

## 2025-08-29 RX ORDER — LISINOPRIL 20 MG/1
20 TABLET ORAL DAILY
Qty: 90 TABLET | Refills: 1 | Status: SHIPPED | OUTPATIENT
Start: 2025-08-29

## (undated) DEVICE — Device: Brand: DEFENDO AIR/WATER/SUCTION AND BIOPSY VALVE

## (undated) DEVICE — Device

## (undated) DEVICE — ENDOGATOR AUXILIARY WATER JET CONNECTOR: Brand: ENDOGATOR

## (undated) DEVICE — SNAR POLYP CAPTIFLX MICRO OVL 13MM 240CM

## (undated) DEVICE — CONN Y IRR DISP 1P/U

## (undated) DEVICE — POLYP TRAP: Brand: TRAPEASE®